# Patient Record
Sex: MALE | Race: WHITE | NOT HISPANIC OR LATINO | Employment: OTHER | URBAN - METROPOLITAN AREA
[De-identification: names, ages, dates, MRNs, and addresses within clinical notes are randomized per-mention and may not be internally consistent; named-entity substitution may affect disease eponyms.]

---

## 2016-09-29 LAB — HBA1C MFR BLD HPLC: 7.1 %

## 2017-01-30 LAB — HBA1C MFR BLD HPLC: 7.6 %

## 2017-05-23 LAB — HBA1C MFR BLD HPLC: 8.4 %

## 2017-09-14 LAB
CREAT ?TM UR-SCNC: 158.4 UMOL/L
HBA1C MFR BLD HPLC: 8.9 %
MICROALBUMIN UR-MCNC: 15.8 MG/L (ref 0–20)
MICROALBUMIN/CREAT UR: 10 MG/G{CREAT}

## 2018-01-17 LAB — HBA1C MFR BLD HPLC: 8.1 %

## 2018-08-01 DIAGNOSIS — E11.8 TYPE 2 DIABETES MELLITUS WITH COMPLICATION, UNSPECIFIED WHETHER LONG TERM INSULIN USE: Primary | ICD-10-CM

## 2018-08-01 RX ORDER — PIOGLITAZONEHYDROCHLORIDE 15 MG/1
15 TABLET ORAL DAILY
Qty: 30 TABLET | Refills: 0 | Status: SHIPPED | OUTPATIENT
Start: 2018-08-01 | End: 2018-08-14 | Stop reason: SDUPTHER

## 2018-08-01 RX ORDER — GLIMEPIRIDE 1 MG/1
1 TABLET ORAL
Qty: 30 TABLET | Refills: 0 | Status: SHIPPED | OUTPATIENT
Start: 2018-08-01 | End: 2018-08-14 | Stop reason: SDUPTHER

## 2018-08-06 LAB — HBA1C MFR BLD HPLC: 7.4 %

## 2018-08-14 ENCOUNTER — OFFICE VISIT (OUTPATIENT)
Dept: ENDOCRINOLOGY | Facility: HOSPITAL | Age: 68
End: 2018-08-14
Payer: MEDICARE

## 2018-08-14 VITALS
HEIGHT: 70 IN | BODY MASS INDEX: 33.01 KG/M2 | WEIGHT: 230.6 LBS | DIASTOLIC BLOOD PRESSURE: 70 MMHG | HEART RATE: 86 BPM | SYSTOLIC BLOOD PRESSURE: 130 MMHG

## 2018-08-14 DIAGNOSIS — E11.42 TYPE 2 DIABETES MELLITUS WITH DIABETIC POLYNEUROPATHY, WITHOUT LONG-TERM CURRENT USE OF INSULIN (HCC): Primary | ICD-10-CM

## 2018-08-14 DIAGNOSIS — E11.8 TYPE 2 DIABETES MELLITUS WITH COMPLICATION, UNSPECIFIED WHETHER LONG TERM INSULIN USE: ICD-10-CM

## 2018-08-14 DIAGNOSIS — E78.5 HYPERLIPIDEMIA, UNSPECIFIED HYPERLIPIDEMIA TYPE: ICD-10-CM

## 2018-08-14 PROCEDURE — 99204 OFFICE O/P NEW MOD 45 MIN: CPT | Performed by: INTERNAL MEDICINE

## 2018-08-14 RX ORDER — NORTRIPTYLINE HYDROCHLORIDE 25 MG/1
25 CAPSULE ORAL DAILY
COMMUNITY
End: 2021-06-17

## 2018-08-14 RX ORDER — GABAPENTIN 100 MG/1
500 CAPSULE ORAL 3 TIMES DAILY
COMMUNITY

## 2018-08-14 RX ORDER — ROSUVASTATIN CALCIUM 5 MG/1
5 TABLET, COATED ORAL DAILY
COMMUNITY

## 2018-08-14 RX ORDER — GLIMEPIRIDE 1 MG/1
2 TABLET ORAL
Qty: 30 TABLET | Refills: 11 | Status: SHIPPED | OUTPATIENT
Start: 2018-08-14 | End: 2018-08-20 | Stop reason: SDUPTHER

## 2018-08-14 RX ORDER — PIOGLITAZONEHYDROCHLORIDE 15 MG/1
30 TABLET ORAL DAILY
Qty: 30 TABLET | Refills: 11 | Status: SHIPPED | OUTPATIENT
Start: 2018-08-14 | End: 2018-08-20 | Stop reason: SDUPTHER

## 2018-08-14 RX ORDER — OMEPRAZOLE 40 MG/1
40 CAPSULE, DELAYED RELEASE ORAL DAILY
COMMUNITY
End: 2021-06-17

## 2018-08-14 RX ORDER — TRAMADOL HYDROCHLORIDE 50 MG/1
50 TABLET ORAL EVERY 6 HOURS
COMMUNITY

## 2018-08-14 NOTE — PROGRESS NOTES
8/14/2018    Assessment/Plan      Diagnoses and all orders for this visit:    Type 2 diabetes mellitus with diabetic polyneuropathy, without long-term current use of insulin (HCC)  -     metFORMIN (GLUCOPHAGE) 1000 MG tablet; Take 1 tablet (1,000 mg total) by mouth 2 (two) times a day with meals  -     HEMOGLOBIN A1C W/ EAG ESTIMATION Lab Collect; Future  -     Comprehensive metabolic panel Lab Collect; Future    Hyperlipidemia, unspecified hyperlipidemia type    Type 2 diabetes mellitus with complication, unspecified whether long term insulin use (HCC)  -     glimepiride (AMARYL) 1 mg tablet; Take 2 tablets (2 mg total) by mouth daily with breakfast  -     pioglitazone (ACTOS) 15 mg tablet; Take 2 tablets (30 mg total) by mouth daily    Other orders  -     Discontinue: metFORMIN (GLUCOPHAGE) 1000 MG tablet; Take 1,000 mg by mouth 2 (two) times a day  -     glucose blood (EVERETT CONTOUR TEST) test strip; every 24 hours  -     rosuvastatin (CRESTOR) 5 mg tablet; Take 5 mg by mouth daily  -     gabapentin (NEURONTIN) 100 mg capsule; Take 100 mg by mouth 3 (three) times a day  -     omeprazole (PriLOSEC) 40 MG capsule; Take 40 mg by mouth daily  -     traMADol (ULTRAM) 50 mg tablet; Take 50 mg by mouth every 6 (six) hours  -     nortriptyline (PAMELOR) 25 mg capsule; Take 25 mg by mouth daily        Assessment/Plan:  1  Type 2 diabetes:  His A1c was improved in recent blood work in closer to goal   However he was taking [place] and [place] and Jardiance for the majority of this A1c  I will increase his Actos to 30 mg daily and glimepiride 2 mg daily  Continue metformin 1000 mg twice a day  He will send in blood sugars in 2 weeks for review  There is room to go up on Actos and glimepiride again, however if we need to make an adjustment, it may be best to start a basal insulin right away  In this regard, he is being followed by Podiatry for a foot wound and Orthopedics for consideration for knee replacements    Therefore, it may be better to start basal insulin if he needs additional glucose control to quickly control his sugars InCase surgery is suggested  Follow-up in 3 months with labs just prior  2   Hyperlipidemia:  Stable on rosuvastatin 5 mg daily  CC: Diabetes Consult    History of Present Illness     HPI: Agapito Sharpe is a 79y o  year old male with type 2 diabetes for about 20 years  He is on oral agents at home and takes Actos 15 mg daily, Metformin 1000 bid, Glimepiride 1 mg daily  He denies any polyuria, polydipsia, nocturia and blurry vision  He denies nephropathy and retinopathy but does admit to neuropathy  He is on crestor 5 mg daily for hld  Hypoglycemic episodes: No      The patient's last eye exam was last summer/fall  Blood Sugar/Glucometer/Pump/CGM review: No logs  Review of Systems   Constitutional: Negative for chills, fatigue and fever  HENT: Negative for trouble swallowing and voice change  Eyes: Negative for visual disturbance  Respiratory: Negative for shortness of breath  Cardiovascular: Negative for chest pain, palpitations and leg swelling  Gastrointestinal: Negative for abdominal pain, nausea and vomiting  Endocrine: Negative for polydipsia and polyuria  Musculoskeletal: Negative for arthralgias and myalgias  Skin: Negative for rash  Neurological: Negative for dizziness, tremors and weakness  Hematological: Negative for adenopathy  Psychiatric/Behavioral: Negative for agitation and confusion  Historical Information   No past medical history on file  No past surgical history on file    Social History   History   Alcohol use Not on file     History   Drug use: Unknown     History   Smoking Status    Former Smoker    Packs/day: 0 75    Years: 15 00    Types: Cigarettes    Start date: 8/14/1983   Smokeless Tobacco    Never Used     Family History:   Family History   Problem Relation Age of Onset    Hypertension Mother     No Known Problems Father    Sergei Dinero Diabetes type II Sister     No Known Problems Brother     Diabetes type II Sister        Meds/Allergies   Current Outpatient Prescriptions   Medication Sig Dispense Refill    gabapentin (NEURONTIN) 100 mg capsule Take 100 mg by mouth 3 (three) times a day      glimepiride (AMARYL) 1 mg tablet Take 2 tablets (2 mg total) by mouth daily with breakfast 30 tablet 11    glucose blood (EVERETT CONTOUR TEST) test strip every 24 hours      metFORMIN (GLUCOPHAGE) 1000 MG tablet Take 1 tablet (1,000 mg total) by mouth 2 (two) times a day with meals 60 tablet 11    nortriptyline (PAMELOR) 25 mg capsule Take 25 mg by mouth daily      omeprazole (PriLOSEC) 40 MG capsule Take 40 mg by mouth daily      pioglitazone (ACTOS) 15 mg tablet Take 2 tablets (30 mg total) by mouth daily 30 tablet 11    rosuvastatin (CRESTOR) 5 mg tablet Take 5 mg by mouth daily      traMADol (ULTRAM) 50 mg tablet Take 50 mg by mouth every 6 (six) hours       No current facility-administered medications for this visit  Allergies   Allergen Reactions    Penicillin G      Other reaction(s): Unknown       Objective   Vitals: Blood pressure 130/70, pulse 86, height 5' 10" (1 778 m), weight 105 kg (230 lb 9 6 oz)  Invasive Devices          No matching active lines, drains, or airways          Physical Exam   Constitutional: He is oriented to person, place, and time  He appears well-developed and well-nourished  No distress  HENT:   Head: Normocephalic and atraumatic  Eyes: Conjunctivae are normal  Pupils are equal, round, and reactive to light  Neck: Normal range of motion  Neck supple  No thyromegaly present  Cardiovascular: Normal rate and regular rhythm  No murmur heard  Pulmonary/Chest: Effort normal and breath sounds normal  No respiratory distress  Abdominal: Soft  Bowel sounds are normal  He exhibits no distension  There is no tenderness  Musculoskeletal: Normal range of motion  He exhibits no edema     Neurological: He is alert and oriented to person, place, and time  He exhibits normal muscle tone  Skin: Skin is warm and dry  No rash noted  He is not diaphoretic  Psychiatric: He has a normal mood and affect  His behavior is normal    Vitals reviewed  The history was obtained from the review of the chart and from the patient  Lab Results:   Labs from [address] [place] [address] on 08/06/2018: White blood cell 6 8, hemoglobin 14 2, platelets 245, glucose 188, BUN 23, creatinine 1 0, GFR 78, sodium 140, potassium 4 9, calcium 10 1, albumin 4 3, liver function within normal limits, total cholesterol 122, triglycerides 111, HDL 46, LDL 54, A1c 7 4, B12 381  No future appointments

## 2018-08-14 NOTE — LETTER
August 14, 2018     [address] [place] [address] [facility]    Patient: Rajeev Wild   YOB: 1950   Date of Visit: 8/14/2018       Dear Dr Carlyle Domingo:    Thank you for referring Rajeev Wild to me for evaluation  Below are my notes for this consultation  If you have questions, please do not hesitate to call me  I look forward to following your patient along with you  Sincerely,        Princess Bucio DO        CC: No Recipients  Princess Bucio DO  8/14/2018  8:36 AM  Sign at close encounter  8/14/2018    Assessment/Plan      Diagnoses and all orders for this visit:    Type 2 diabetes mellitus with diabetic polyneuropathy, without long-term current use of insulin (HCC)  -     metFORMIN (GLUCOPHAGE) 1000 MG tablet; Take 1 tablet (1,000 mg total) by mouth 2 (two) times a day with meals  -     HEMOGLOBIN A1C W/ EAG ESTIMATION Lab Collect; Future  -     Comprehensive metabolic panel Lab Collect; Future    Hyperlipidemia, unspecified hyperlipidemia type    Type 2 diabetes mellitus with complication, unspecified whether long term insulin use (HCC)  -     glimepiride (AMARYL) 1 mg tablet; Take 2 tablets (2 mg total) by mouth daily with breakfast  -     pioglitazone (ACTOS) 15 mg tablet; Take 2 tablets (30 mg total) by mouth daily    Other orders  -     Discontinue: metFORMIN (GLUCOPHAGE) 1000 MG tablet; Take 1,000 mg by mouth 2 (two) times a day  -     glucose blood (EVERETT CONTOUR TEST) test strip; every 24 hours  -     rosuvastatin (CRESTOR) 5 mg tablet; Take 5 mg by mouth daily  -     gabapentin (NEURONTIN) 100 mg capsule; Take 100 mg by mouth 3 (three) times a day  -     omeprazole (PriLOSEC) 40 MG capsule; Take 40 mg by mouth daily  -     traMADol (ULTRAM) 50 mg tablet; Take 50 mg by mouth every 6 (six) hours  -     nortriptyline (PAMELOR) 25 mg capsule; Take 25 mg by mouth daily        Assessment/Plan:  1    Type 2 diabetes:  His A1c was improved in recent blood work in closer to goal  However he was taking [place] and Jardiance for the majority of this A1c  I will increase his Actos to 30 mg daily and glimepiride 2 mg daily  Continue metformin 1000 mg twice a day  He will send in blood sugars in 2 weeks for review  There is room to go up on Actos and glimepiride again, however if we need to make an adjustment, it may be best to start a basal insulin right away  In this regard, he is being followed by Podiatry for a foot wound and Orthopedics for consideration for knee replacements  Therefore, it may be better to start basal insulin if he needs additional glucose control to quickly control his sugars InCase surgery is suggested  Follow-up in 3 months with labs just prior  2   Hyperlipidemia:  Stable on rosuvastatin 5 mg daily  CC: Diabetes Consult    History of Present Illness     HPI: Jose E Steel is a 79y o  year old male with type 2 diabetes for about 20 years  He is on oral agents at home and takes Actos 15 mg daily, Metformin 1000 bid, Glimepiride 1 mg daily  He denies any polyuria, polydipsia, nocturia and blurry vision  He denies nephropathy and retinopathy but does admit to neuropathy  He is on crestor 5 mg daily for hld  Hypoglycemic episodes: No      The patient's last eye exam was last summer/fall  Blood Sugar/Glucometer/Pump/CGM review: No logs  Review of Systems   Constitutional: Negative for chills, fatigue and fever  HENT: Negative for trouble swallowing and voice change  Eyes: Negative for visual disturbance  Respiratory: Negative for shortness of breath  Cardiovascular: Negative for chest pain, palpitations and leg swelling  Gastrointestinal: Negative for abdominal pain, nausea and vomiting  Endocrine: Negative for polydipsia and polyuria  Musculoskeletal: Negative for arthralgias and myalgias  Skin: Negative for rash  Neurological: Negative for dizziness, tremors and weakness  Hematological: Negative for adenopathy  Psychiatric/Behavioral: Negative for agitation and confusion  Historical Information   No past medical history on file  No past surgical history on file  Social History   History   Alcohol use Not on file     History   Drug use: Unknown     History   Smoking Status    Former Smoker    Packs/day: 0 75    Years: 15 00    Types: Cigarettes    Start date: 8/14/1983   Smokeless Tobacco    Never Used     Family History:   Family History   Problem Relation Age of Onset    Hypertension Mother     No Known Problems Father     Diabetes type II Sister     No Known Problems Brother     Diabetes type II Sister        Meds/Allergies   Current Outpatient Prescriptions   Medication Sig Dispense Refill    gabapentin (NEURONTIN) 100 mg capsule Take 100 mg by mouth 3 (three) times a day      glimepiride (AMARYL) 1 mg tablet Take 2 tablets (2 mg total) by mouth daily with breakfast 30 tablet 11    glucose blood (EVERETT CONTOUR TEST) test strip every 24 hours      metFORMIN (GLUCOPHAGE) 1000 MG tablet Take 1 tablet (1,000 mg total) by mouth 2 (two) times a day with meals 60 tablet 11    nortriptyline (PAMELOR) 25 mg capsule Take 25 mg by mouth daily      omeprazole (PriLOSEC) 40 MG capsule Take 40 mg by mouth daily      pioglitazone (ACTOS) 15 mg tablet Take 2 tablets (30 mg total) by mouth daily 30 tablet 11    rosuvastatin (CRESTOR) 5 mg tablet Take 5 mg by mouth daily      traMADol (ULTRAM) 50 mg tablet Take 50 mg by mouth every 6 (six) hours       No current facility-administered medications for this visit  Allergies   Allergen Reactions    Penicillin G      Other reaction(s): Unknown       Objective   Vitals: Blood pressure 130/70, pulse 86, height 5' 10" (1 778 m), weight 105 kg (230 lb 9 6 oz)  Invasive Devices          No matching active lines, drains, or airways          Physical Exam   Constitutional: He is oriented to person, place, and time  He appears well-developed and well-nourished   No distress  HENT:   Head: Normocephalic and atraumatic  Eyes: Conjunctivae are normal  Pupils are equal, round, and reactive to light  Neck: Normal range of motion  Neck supple  No thyromegaly present  Cardiovascular: Normal rate and regular rhythm  No murmur heard  Pulmonary/Chest: Effort normal and breath sounds normal  No respiratory distress  Abdominal: Soft  Bowel sounds are normal  He exhibits no distension  There is no tenderness  Musculoskeletal: Normal range of motion  He exhibits no edema  Neurological: He is alert and oriented to person, place, and time  He exhibits normal muscle tone  Skin: Skin is warm and dry  No rash noted  He is not diaphoretic  Psychiatric: He has a normal mood and affect  His behavior is normal    Vitals reviewed  The history was obtained from the review of the chart and from the patient  Lab Results:   Labs from [address] [place] [address] on 08/06/2018: White blood cell 6 8, hemoglobin 14 2, platelets 529, glucose 188, BUN 23, creatinine 1 0, GFR 78, sodium 140, potassium 4 9, calcium 10 1, albumin 4 3, liver function within normal limits, total cholesterol 122, triglycerides 111, HDL 46, LDL 54, A1c 7 4, B12 381  No future appointments

## 2018-08-20 DIAGNOSIS — E11.8 TYPE 2 DIABETES MELLITUS WITH COMPLICATION, UNSPECIFIED WHETHER LONG TERM INSULIN USE: ICD-10-CM

## 2018-08-20 RX ORDER — PIOGLITAZONEHYDROCHLORIDE 15 MG/1
30 TABLET ORAL DAILY
Qty: 60 TABLET | Refills: 5 | Status: SHIPPED | OUTPATIENT
Start: 2018-08-20 | End: 2018-08-21 | Stop reason: SDUPTHER

## 2018-08-20 RX ORDER — GLIMEPIRIDE 1 MG/1
2 TABLET ORAL
Qty: 60 TABLET | Refills: 5 | Status: SHIPPED | OUTPATIENT
Start: 2018-08-20 | End: 2018-08-21 | Stop reason: SDUPTHER

## 2018-08-20 NOTE — TELEPHONE ENCOUNTER
You sent scripts for pt's Actos and Glimepiride  He is to be taking them twice daily but we only sent #30  Please resend

## 2018-08-21 DIAGNOSIS — E11.8 TYPE 2 DIABETES MELLITUS WITH COMPLICATION, UNSPECIFIED WHETHER LONG TERM INSULIN USE: ICD-10-CM

## 2018-08-21 RX ORDER — PIOGLITAZONEHYDROCHLORIDE 30 MG/1
TABLET ORAL
Qty: 30 TABLET | Refills: 11 | Status: SHIPPED | OUTPATIENT
Start: 2018-08-21 | End: 2018-11-29 | Stop reason: SDUPTHER

## 2018-08-21 RX ORDER — GLIMEPIRIDE 2 MG/1
TABLET ORAL
Qty: 30 TABLET | Refills: 11 | Status: SHIPPED | OUTPATIENT
Start: 2018-08-21 | End: 2019-01-22 | Stop reason: SDUPTHER

## 2018-09-27 ENCOUNTER — TELEPHONE (OUTPATIENT)
Dept: ENDOCRINOLOGY | Facility: HOSPITAL | Age: 68
End: 2018-09-27

## 2018-09-27 NOTE — TELEPHONE ENCOUNTER
Received a request regarding clearance for left total knee replacement for the patient  His A1c from 08/06/2018 was 7 4 which is okay  Because his medications have changed per my last office note, I asked that he send in blood sugar logs for review before I sign off on diabetes control before surgery  Can he send these in from the previous 2 weeks?

## 2018-10-05 NOTE — TELEPHONE ENCOUNTER
Reviewed blood sugars from 09/27 through 10/3  Fasting blood sugars range from  and 6:00 p m  blood sugars range from 147-177  Overall based on the A1c as well as these blood sugar ranges I think the patient is at reasonable risk from a diabetes perspective in glycemic control for upcoming orthopedic surgery  On the day of surgery, he should NOT take glimepiride to avoid possible low blood sugars  He should take metformin and Actos today of surgery though

## 2018-11-24 LAB
ALBUMIN SERPL-MCNC: 3.9 G/DL (ref 3.6–4.8)
ALBUMIN/GLOB SERPL: 1.6 {RATIO} (ref 1.2–2.2)
ALP SERPL-CCNC: 98 IU/L (ref 39–117)
ALT SERPL-CCNC: 12 IU/L (ref 0–44)
AST SERPL-CCNC: 14 IU/L (ref 0–40)
BILIRUB SERPL-MCNC: 0.2 MG/DL (ref 0–1.2)
BUN SERPL-MCNC: 15 MG/DL (ref 8–27)
BUN/CREAT SERPL: 15 (ref 10–24)
CALCIUM SERPL-MCNC: 9.5 MG/DL (ref 8.6–10.2)
CHLORIDE SERPL-SCNC: 98 MMOL/L (ref 96–106)
CO2 SERPL-SCNC: 27 MMOL/L (ref 20–29)
CREAT SERPL-MCNC: 1.03 MG/DL (ref 0.76–1.27)
EST. AVERAGE GLUCOSE BLD GHB EST-MCNC: 166 MG/DL
GLOBULIN SER-MCNC: 2.5 G/DL (ref 1.5–4.5)
GLUCOSE SERPL-MCNC: 171 MG/DL (ref 65–99)
HBA1C MFR BLD: 7.4 % (ref 4.8–5.6)
POTASSIUM SERPL-SCNC: 4.9 MMOL/L (ref 3.5–5.2)
PROT SERPL-MCNC: 6.4 G/DL (ref 6–8.5)
SL AMB EGFR AFRICAN AMERICAN: 87 ML/MIN/1.73
SL AMB EGFR NON AFRICAN AMERICAN: 75 ML/MIN/1.73
SODIUM SERPL-SCNC: 138 MMOL/L (ref 134–144)

## 2018-11-29 ENCOUNTER — OFFICE VISIT (OUTPATIENT)
Dept: ENDOCRINOLOGY | Facility: HOSPITAL | Age: 68
End: 2018-11-29
Payer: MEDICARE

## 2018-11-29 VITALS
SYSTOLIC BLOOD PRESSURE: 142 MMHG | DIASTOLIC BLOOD PRESSURE: 80 MMHG | WEIGHT: 233.4 LBS | HEIGHT: 70 IN | BODY MASS INDEX: 33.41 KG/M2 | HEART RATE: 90 BPM

## 2018-11-29 DIAGNOSIS — E11.42 TYPE 2 DIABETES MELLITUS WITH DIABETIC POLYNEUROPATHY, WITHOUT LONG-TERM CURRENT USE OF INSULIN (HCC): Primary | ICD-10-CM

## 2018-11-29 DIAGNOSIS — E11.8 TYPE 2 DIABETES MELLITUS WITH COMPLICATION, UNSPECIFIED WHETHER LONG TERM INSULIN USE: ICD-10-CM

## 2018-11-29 DIAGNOSIS — E78.5 HYPERLIPIDEMIA, UNSPECIFIED HYPERLIPIDEMIA TYPE: ICD-10-CM

## 2018-11-29 PROCEDURE — 99213 OFFICE O/P EST LOW 20 MIN: CPT | Performed by: NURSE PRACTITIONER

## 2018-11-29 RX ORDER — PIOGLITAZONEHYDROCHLORIDE 45 MG/1
TABLET ORAL
Qty: 30 TABLET | Refills: 4 | Status: SHIPPED | OUTPATIENT
Start: 2018-11-29 | End: 2019-05-20 | Stop reason: SDUPTHER

## 2018-11-29 NOTE — PROGRESS NOTES
Fabby Dailey 79 y o  male MRN: [id number]    Encounter: [id number]      Assessment/Plan     Assessment: This is a 79y o -year-old male with type 2 diabetes with hyperlipidemia  Plan:  1  Type 2 diabetes:  His hemoglobin A1c is unchanged at 7 4  He has not checked his blood sugars since his left knee replacement surgery approximately 1 month ago  For now, we will increase Actos to 45 mg   I have asked him to check his blood sugars twice daily at alternating times and send a record back to the office in 2 weeks for review so that further adjustments can be made to his regimen to help his glycemic control throughout the day  Stressed the importance of checking blood sugars regularly  He is continuing to participate in PT after his surgery  He is following up with Dr Aj Malagon for regular diabetic foot care  I have encouraged him to have a diabetic eye exam completed and a report sent to the office to be made part of his chart  Check hemoglobin A1c, comprehensive metabolic panel and urine microalbumin prior to next visit  2   Hyperlipidemia:   Continue Crestor  Check fasting lipid panel prior to next visit  CC:   Type 2 Diabetes follow-up    History of Present Illness     HPI:  79y o  year old male with type 2 diabetes for about 20 years  He is on oral agents at home and takes Actos 30 mg daily, Metformin 1000 twice daily, Glimepiride 2 mg daily  He is one month status post left knee replacement  He denies any episodes of hypoglycemia, polyuria, polydipsia, nocturia and blurry vision  He denies nephropathy and retinopathy but does admit to neuropathy  He follows Dr Bibi Grover for regular diabetic foot care following a diabetic ulcer to his left great toe which has since healed    He denies retinopathy or blurry vision but states that he is due for a diabetic eye exam      Blood Sugar/Glucometer/Pump/CGM review: No logs provided for review in the office today   He states he is not checking his blood sugars  For his hyperlipidemia, he is treated with Crestor 5 mg daily  Review of Systems   Constitutional: Negative  Negative for diaphoresis, fatigue and fever  HENT: Negative  Negative for trouble swallowing  Eyes: Negative  Respiratory: Negative for cough and shortness of breath  Cardiovascular: Negative for chest pain and palpitations  Gastrointestinal: Negative for abdominal pain, constipation, diarrhea, nausea and vomiting  Endocrine: Positive for polyuria (Once per night nocturia)  Negative for cold intolerance, heat intolerance, polydipsia and polyphagia  Genitourinary: Negative  Musculoskeletal: Positive for gait problem (utilizes cane) and joint swelling (left knee-post op)  Skin: Negative  Allergic/Immunologic: Negative  Neurological: Negative for dizziness, syncope, light-headedness and headaches  Hematological: Negative  Psychiatric/Behavioral: Negative  All other systems reviewed and are negative  Historical Information   No past medical history on file  No past surgical history on file  Social History   History   Alcohol use Not on file     History   Drug use: Unknown     History   Smoking Status    Former Smoker    Packs/day: 0 75    Years: 15 00    Types: Cigarettes    Start date: 8/14/1983   Smokeless Tobacco    Never Used     Family History:   Family History   Problem Relation Age of Onset    Hypertension Mother     No Known Problems Father     Diabetes type II Sister     No Known Problems Brother     Diabetes type II Sister        Meds/Allergies   Current Outpatient Prescriptions   Medication Sig Dispense Refill    gabapentin (NEURONTIN) 100 mg capsule Take 100 mg by mouth 3 (three) times a day      glimepiride (AMARYL) 2 mg tablet 1 tab daily   30 tablet 11    glucose blood (EVERETT CONTOUR TEST) test strip every 24 hours      metFORMIN (GLUCOPHAGE) 1000 MG tablet Take 1 tablet (1,000 mg total) by mouth 2 (two) times a day with meals 60 tablet 11    nortriptyline (PAMELOR) 25 mg capsule Take 25 mg by mouth daily      omeprazole (PriLOSEC) 40 MG capsule Take 40 mg by mouth daily      pioglitazone (ACTOS) 30 mg tablet 1 tab daily  30 tablet 11    rosuvastatin (CRESTOR) 5 mg tablet Take 5 mg by mouth daily      traMADol (ULTRAM) 50 mg tablet Take 50 mg by mouth every 6 (six) hours       No current facility-administered medications for this visit  Allergies   Allergen Reactions    Penicillin G      Other reaction(s): Unknown       Objective   Vitals: Blood pressure 142/80, pulse 90, height 5' 10" (1 778 m), weight 106 kg (233 lb 6 4 oz)  Physical Exam   Constitutional: He is oriented to person, place, and time  He appears well-developed and well-nourished  No distress  Overweight   HENT:   Head: Normocephalic and atraumatic  Nose: Nose normal    Mouth/Throat: Oropharynx is clear and moist    Eyes: Pupils are equal, round, and reactive to light  Conjunctivae and EOM are normal  Right eye exhibits no discharge  Left eye exhibits no discharge  No scleral icterus  Neck: Normal range of motion  Neck supple  No JVD present  No tracheal deviation present  No thyromegaly present  Cardiovascular: Normal rate, regular rhythm, normal heart sounds and intact distal pulses  Exam reveals no gallop and no friction rub  No murmur heard  Pulmonary/Chest: Effort normal and breath sounds normal  No stridor  No respiratory distress  He has no wheezes  He has no rales  He exhibits no tenderness  Abdominal: Soft  Bowel sounds are normal  He exhibits no distension  There is no tenderness  Musculoskeletal: Normal range of motion  He exhibits no edema, tenderness or deformity  Lymphadenopathy:     He has no cervical adenopathy  Neurological: He is alert and oriented to person, place, and time  He displays normal reflexes  No cranial nerve deficit  Coordination (utilizes cane -post op) abnormal    Skin: Skin is warm and dry   No rash noted  No erythema  No pallor  Dry skin   Psychiatric: He has a normal mood and affect  His behavior is normal  Judgment and thought content normal    Vitals reviewed  Lab Results:   Lab Results   Component Value Date/Time    Hemoglobin A1C 7 4 (H) 11/23/2018 10:13 AM    Hemoglobin A1C 7 4 08/06/2018    Hemoglobin A1C 8 1 01/17/2018    BUN 15 11/23/2018 10:13 AM    Potassium 4 9 11/23/2018 10:13 AM    Chloride 98 11/23/2018 10:13 AM    CO2 27 11/23/2018 10:13 AM    Albumin 3 9 11/23/2018 10:13 AM    Globulin, Total 2 5 11/23/2018 10:13 AM     Portions of the record may have been created with voice recognition software  Occasional wrong word or "sound a like" substitutions may have occurred due to the inherent limitations of voice recognition software  Read the chart carefully and recognize, using context, where substitutions have occurred

## 2018-11-29 NOTE — PATIENT INSTRUCTIONS
Be mindful of diet  Stay active and stay hydrated  Check your blood sugars regularly  Increase Actos to 45 mg daily  Continue metformin and glimepiride at current dose  Send a record of your blood sugars to the office in 2 weeks for review  Continue to follow up with Dr Dickson Aggarwal for Podiatry  Follow up with Ophthalmology for a diabetic eye exam     Continue Crestor

## 2019-01-22 DIAGNOSIS — E11.8 TYPE 2 DIABETES MELLITUS WITH COMPLICATION, UNSPECIFIED WHETHER LONG TERM INSULIN USE: ICD-10-CM

## 2019-01-23 RX ORDER — GLIMEPIRIDE 2 MG/1
TABLET ORAL
Qty: 90 TABLET | Refills: 1 | Status: SHIPPED | OUTPATIENT
Start: 2019-01-23 | End: 2019-03-07

## 2019-02-27 LAB
ALBUMIN SERPL-MCNC: 4.2 G/DL (ref 3.6–4.8)
ALBUMIN/CREAT UR: 7.4 MG/G CREAT (ref 0–30)
ALBUMIN/GLOB SERPL: 1.7 {RATIO} (ref 1.2–2.2)
ALP SERPL-CCNC: 99 IU/L (ref 39–117)
ALT SERPL-CCNC: 16 IU/L (ref 0–44)
AST SERPL-CCNC: 18 IU/L (ref 0–40)
BILIRUB SERPL-MCNC: <0.2 MG/DL (ref 0–1.2)
BUN SERPL-MCNC: 12 MG/DL (ref 8–27)
BUN/CREAT SERPL: 13 (ref 10–24)
CALCIUM SERPL-MCNC: 9.5 MG/DL (ref 8.6–10.2)
CHLORIDE SERPL-SCNC: 103 MMOL/L (ref 96–106)
CHOLEST SERPL-MCNC: 107 MG/DL (ref 100–199)
CO2 SERPL-SCNC: 26 MMOL/L (ref 20–29)
CREAT SERPL-MCNC: 0.9 MG/DL (ref 0.76–1.27)
CREAT UR-MCNC: 128.7 MG/DL
EST. AVERAGE GLUCOSE BLD GHB EST-MCNC: 171 MG/DL
GLOBULIN SER-MCNC: 2.5 G/DL (ref 1.5–4.5)
GLUCOSE SERPL-MCNC: 137 MG/DL (ref 65–99)
HBA1C MFR BLD: 7.6 % (ref 4.8–5.6)
HDLC SERPL-MCNC: 45 MG/DL
LDLC SERPL CALC-MCNC: 41 MG/DL (ref 0–99)
MICROALBUMIN UR-MCNC: 9.5 UG/ML
POTASSIUM SERPL-SCNC: 4.9 MMOL/L (ref 3.5–5.2)
PROT SERPL-MCNC: 6.7 G/DL (ref 6–8.5)
SL AMB EGFR AFRICAN AMERICAN: 102 ML/MIN/1.73
SL AMB EGFR NON AFRICAN AMERICAN: 88 ML/MIN/1.73
SL AMB VLDL CHOLESTEROL CALC: 21 MG/DL (ref 5–40)
SODIUM SERPL-SCNC: 142 MMOL/L (ref 134–144)
TRIGL SERPL-MCNC: 105 MG/DL (ref 0–149)

## 2019-03-07 ENCOUNTER — OFFICE VISIT (OUTPATIENT)
Dept: ENDOCRINOLOGY | Facility: HOSPITAL | Age: 69
End: 2019-03-07
Payer: MEDICARE

## 2019-03-07 VITALS
BODY MASS INDEX: 33.49 KG/M2 | HEIGHT: 70 IN | SYSTOLIC BLOOD PRESSURE: 120 MMHG | HEART RATE: 80 BPM | DIASTOLIC BLOOD PRESSURE: 62 MMHG

## 2019-03-07 DIAGNOSIS — E11.42 TYPE 2 DIABETES MELLITUS WITH DIABETIC POLYNEUROPATHY, WITHOUT LONG-TERM CURRENT USE OF INSULIN (HCC): Primary | ICD-10-CM

## 2019-03-07 DIAGNOSIS — E78.5 HYPERLIPIDEMIA, UNSPECIFIED HYPERLIPIDEMIA TYPE: ICD-10-CM

## 2019-03-07 PROCEDURE — 99214 OFFICE O/P EST MOD 30 MIN: CPT | Performed by: INTERNAL MEDICINE

## 2019-03-07 RX ORDER — GLIMEPIRIDE 4 MG/1
TABLET ORAL
Qty: 30 TABLET | Refills: 11 | Status: SHIPPED | OUTPATIENT
Start: 2019-03-07 | End: 2020-03-18

## 2019-03-07 RX ORDER — ASPIRIN 81 MG/1
81 TABLET ORAL DAILY
COMMUNITY

## 2019-03-07 NOTE — PROGRESS NOTES
3/7/2019    Assessment/Plan      Diagnoses and all orders for this visit:    Type 2 diabetes mellitus with diabetic polyneuropathy, without long-term current use of insulin (HCC)  -     glimepiride (AMARYL) 4 mg tablet; 1 tab daily   -     HEMOGLOBIN A1C W/ EAG ESTIMATION Lab Collect; Future  -     Comprehensive metabolic panel Lab Collect; Future  -     Microalbumin / creatinine urine ratio- Lab Collect; Future  -     TSH, 3rd generation Lab Collect; Future  -     CBC and differential- Lab Collect; Future  -     Ambulatory referral to medical nutrition therapy for diabetes; Future    Hyperlipidemia, unspecified hyperlipidemia type    Other orders  -     aspirin (ECOTRIN LOW STRENGTH) 81 mg EC tablet; Take 81 mg by mouth daily        Assessment/Plan:  1  Type 2 diabetes:  He continues to follow-up with his podiatrist as well as Orthopedics for Charcot foot  In this respect, he has not been as active  His A1c remains above goal likely related to decreased activity  Will increase glimepiride 4 mg daily  Continue Actos 45 mg daily metformin 1000 mg twice a day  I asked him to call me if he develops any hypoglycemia  Will have him meet with [facility] as well  2  Hyperlipidemia:  Continue rosuvastatin 5 mg daily  CC: Diabetes Consult    History of Present Illness     HPI: Rolan Nieto is a 81y o  year old male with type 2 diabetes for Over 20 years  He is on oral agents at home and takes metformin 1000 mg twice a day, glimepiride 2 mg daily, Actos 45 mg daily  [place] Sheikh He denies any polyuria, polydipsia, nocturia and blurry vision  He denies nephropathy and retinopathy but does admit to neuropathy  Hypoglycemic episodes: No      He is due for an eye exam will call schedule this  He follows with Dr Bhavik Jarquin regularly for diabetes foot care per    Blood Sugar/Glucometer/Pump/CGM review:  Checks his blood sugar every now and then and typically is around 150 at various times of the day      For hyperlipidemia he is maintained on rosuvastatin 5 mg daily  Review of Systems   Constitutional: Negative for fatigue  HENT: Negative for trouble swallowing and voice change  Eyes: Negative for visual disturbance  Respiratory: Negative for shortness of breath  Cardiovascular: Negative for palpitations and leg swelling  Gastrointestinal: Negative for abdominal pain, nausea and vomiting  Endocrine: Negative for polydipsia and polyuria  Musculoskeletal: Negative for arthralgias and myalgias  Skin: Negative for rash  Neurological: Negative for dizziness, tremors and weakness  Hematological: Negative for adenopathy  Psychiatric/Behavioral: Negative for agitation and confusion  Historical Information   History reviewed  No pertinent past medical history  History reviewed  No pertinent surgical history    Social History   Social History     Substance and Sexual Activity   Alcohol Use Not on file     Social History     Substance and Sexual Activity   Drug Use Not on file     Social History     Tobacco Use   Smoking Status Former Smoker    Packs/day: 0 75    Years: 15 00    Pack years: 11 25    Types: Cigarettes    Start date: 8/14/1983   Smokeless Tobacco Never Used     Family History:   Family History   Problem Relation Age of Onset    Hypertension Mother     No Known Problems Father     Diabetes type II Sister     No Known Problems Brother     Diabetes type II Sister        Meds/Allergies   Current Outpatient Medications   Medication Sig Dispense Refill    aspirin (ECOTRIN LOW STRENGTH) 81 mg EC tablet Take 81 mg by mouth daily      gabapentin (NEURONTIN) 100 mg capsule Take 500 mg by mouth 3 (three) times a day       glucose blood (EVERETT CONTOUR TEST) test strip every 24 hours      metFORMIN (GLUCOPHAGE) 1000 MG tablet Take 1 tablet (1,000 mg total) by mouth 2 (two) times a day with meals 60 tablet 11    nortriptyline (PAMELOR) 25 mg capsule Take 25 mg by mouth daily      omeprazole (PriLOSEC) 40 MG capsule Take 40 mg by mouth daily      pioglitazone (ACTOS) 45 mg tablet 1 tab daily  30 tablet 4    rosuvastatin (CRESTOR) 5 mg tablet Take 5 mg by mouth daily      traMADol (ULTRAM) 50 mg tablet Take 50 mg by mouth every 6 (six) hours      glimepiride (AMARYL) 4 mg tablet 1 tab daily  30 tablet 11     No current facility-administered medications for this visit  Allergies   Allergen Reactions    Penicillin G      Other reaction(s): Unknown       Objective   Vitals: Blood pressure 120/62, pulse 80, height 5' 10" (1 778 m)  Invasive Devices          None          Physical Exam   Constitutional: He is oriented to person, place, and time  He appears well-developed and well-nourished  No distress  HENT:   Head: Normocephalic and atraumatic  Eyes: Pupils are equal, round, and reactive to light  Conjunctivae are normal    Neck: Normal range of motion  Neck supple  No thyromegaly present  Cardiovascular: Normal rate and regular rhythm  Pulmonary/Chest: Effort normal and breath sounds normal    Abdominal: Soft  Bowel sounds are normal    Musculoskeletal: Normal range of motion  He exhibits no edema  Neurological: He is alert and oriented to person, place, and time  He exhibits normal muscle tone  Skin: Skin is warm and dry  No rash noted  He is not diaphoretic  Psychiatric: He has a normal mood and affect  His behavior is normal    Vitals reviewed  The history was obtained from the review of the chart and from the patient      Lab Results:    Most recent Alc is  Lab Results   Component Value Date    HGBA1C 7 6 (H) 02/26/2019           No components found for: HA1C  No components found for: GLU    Lab Results   Component Value Date    BUN 12 02/26/2019    K 4 9 02/26/2019     02/26/2019    CO2 26 02/26/2019     No results found for: EGFR  No components found for: [facility]    Lab Results   Component Value Date    HDL 45 02/26/2019    TRIG 105 02/26/2019       Lab Results   Component Value Date    ALT 16 02/26/2019    AST 18 02/26/2019       No results found for: TSH, FREET4, TSI    Recent Results (from the past 24140 hour(s))   Hemoglobin A1C    Collection Time: 01/17/18 12:00 AM   Result Value Ref Range    Hemoglobin A1C 8 1    Hemoglobin A1C    Collection Time: 08/06/18 12:00 AM   Result Value Ref Range    Hemoglobin A1C 7 4    Comprehensive metabolic panel    Collection Time: 11/23/18 10:13 AM   Result Value Ref Range    Glucose, Random 171 (H) 65 - 99 mg/dL    BUN 15 8 - 27 mg/dL    Creatinine 1 03 0 76 - 1 27 mg/dL    eGFR Non African American 75 >59 mL/min/1 73    eGFR  87 >59 mL/min/1 73    SL AMB BUN/CREATININE RATIO 15 10 - 24    Sodium 138 134 - 144 mmol/L    Potassium 4 9 3 5 - 5 2 mmol/L    Chloride 98 96 - 106 mmol/L    CO2 27 20 - 29 mmol/L    CALCIUM 9 5 8 6 - 10 2 mg/dL    Protein, Total 6 4 6 0 - 8 5 g/dL    Albumin 3 9 3 6 - 4 8 g/dL    Globulin, Total 2 5 1 5 - 4 5 g/dL    Albumin/Globulin Ratio 1 6 1 2 - 2 2    TOTAL BILIRUBIN 0 2 0 0 - 1 2 mg/dL    Alk Phos Isoenzymes 98 39 - 117 IU/L    AST 14 0 - 40 IU/L    ALT 12 0 - 44 IU/L   Hemoglobin A1C    Collection Time: 11/23/18 10:13 AM   Result Value Ref Range    Hemoglobin A1C 7 4 (H) 4 8 - 5 6 %    Estimated Average Glucose 166 mg/dL   Comprehensive metabolic panel    Collection Time: 02/26/19 10:30 AM   Result Value Ref Range    Glucose, Random 137 (H) 65 - 99 mg/dL    BUN 12 8 - 27 mg/dL    Creatinine 0 90 0 76 - 1 27 mg/dL    eGFR Non  88 >59 mL/min/1 73    eGFR  102 >59 mL/min/1 73    SL AMB BUN/CREATININE RATIO 13 10 - 24    Sodium 142 134 - 144 mmol/L    Potassium 4 9 3 5 - 5 2 mmol/L    Chloride 103 96 - 106 mmol/L    CO2 26 20 - 29 mmol/L    CALCIUM 9 5 8 6 - 10 2 mg/dL    Protein, Total 6 7 6 0 - 8 5 g/dL    Albumin 4 2 3 6 - 4 8 g/dL    Globulin, Total 2 5 1 5 - 4 5 g/dL    Albumin/Globulin Ratio 1 7 1 2 - 2 2    TOTAL BILIRUBIN <0 2 0 0 - 1 2 mg/dL    Alk Phos Isoenzymes 99 39 - 117 IU/L    AST 18 0 - 40 IU/L    ALT 16 0 - 44 IU/L   Lipid panel    Collection Time: 02/26/19 10:30 AM   Result Value Ref Range    Cholesterol, Total 107 100 - 199 mg/dL    Triglycerides 105 0 - 149 mg/dL    HDL 45 >39 mg/dL    VLDL Cholesterol Calculated 21 5 - 40 mg/dL    LDL Direct 41 0 - 99 mg/dL   Microalbumin / creatinine urine ratio    Collection Time: 02/26/19 10:30 AM   Result Value Ref Range    Creatinine, Urine 128 7 Not Estab  mg/dL    Microalbum  ,U,Random 9 5 Not Estab  ug/mL    Microalb/Creat Ratio 7 4 0 0 - 30 0 mg/g creat   Hemoglobin A1C    Collection Time: 02/26/19 10:30 AM   Result Value Ref Range    Hemoglobin A1C 7 6 (H) 4 8 - 5 6 %    Estimated Average Glucose 171 mg/dL         No future appointments  Portions of the record may have been created with voice recognition software  Occasional wrong word or "sound a like" substitutions may have occurred due to the inherent limitations of voice recognition software  Read the chart carefully and recognize, using context, where substitutions have occurred

## 2019-05-20 DIAGNOSIS — E11.8 TYPE 2 DIABETES MELLITUS WITH COMPLICATION, UNSPECIFIED WHETHER LONG TERM INSULIN USE: ICD-10-CM

## 2019-05-20 RX ORDER — PIOGLITAZONEHYDROCHLORIDE 45 MG/1
TABLET ORAL
Qty: 30 TABLET | Refills: 4 | Status: SHIPPED | OUTPATIENT
Start: 2019-05-20 | End: 2019-05-24 | Stop reason: SDUPTHER

## 2019-05-24 DIAGNOSIS — E11.8 TYPE 2 DIABETES MELLITUS WITH COMPLICATION, UNSPECIFIED WHETHER LONG TERM INSULIN USE: ICD-10-CM

## 2019-05-28 RX ORDER — PIOGLITAZONEHYDROCHLORIDE 45 MG/1
TABLET ORAL
Qty: 30 TABLET | Refills: 4 | Status: SHIPPED | OUTPATIENT
Start: 2019-05-28 | End: 2019-11-13 | Stop reason: SDUPTHER

## 2019-06-07 LAB
ALBUMIN SERPL-MCNC: 4 G/DL (ref 3.6–4.8)
ALBUMIN/CREAT UR: <4.5 MG/G CREAT (ref 0–30)
ALBUMIN/GLOB SERPL: 1.4 {RATIO} (ref 1.2–2.2)
ALP SERPL-CCNC: 101 IU/L (ref 39–117)
ALT SERPL-CCNC: 15 IU/L (ref 0–44)
AST SERPL-CCNC: 17 IU/L (ref 0–40)
BASOPHILS # BLD AUTO: 0 X10E3/UL (ref 0–0.2)
BASOPHILS NFR BLD AUTO: 0 %
BILIRUB SERPL-MCNC: <0.2 MG/DL (ref 0–1.2)
BUN SERPL-MCNC: 17 MG/DL (ref 8–27)
BUN/CREAT SERPL: 16 (ref 10–24)
CALCIUM SERPL-MCNC: 9.5 MG/DL (ref 8.6–10.2)
CHLORIDE SERPL-SCNC: 100 MMOL/L (ref 96–106)
CO2 SERPL-SCNC: 25 MMOL/L (ref 20–29)
CREAT SERPL-MCNC: 1.07 MG/DL (ref 0.76–1.27)
CREAT UR-MCNC: 67.4 MG/DL
EOSINOPHIL # BLD AUTO: 0.2 X10E3/UL (ref 0–0.4)
EOSINOPHIL NFR BLD AUTO: 3 %
ERYTHROCYTE [DISTWIDTH] IN BLOOD BY AUTOMATED COUNT: 16.1 % (ref 12.3–15.4)
EST. AVERAGE GLUCOSE BLD GHB EST-MCNC: 180 MG/DL
GLOBULIN SER-MCNC: 2.8 G/DL (ref 1.5–4.5)
GLUCOSE SERPL-MCNC: 146 MG/DL (ref 65–99)
HBA1C MFR BLD: 7.9 % (ref 4.8–5.6)
HCT VFR BLD AUTO: 33.1 % (ref 37.5–51)
HGB BLD-MCNC: 10.6 G/DL (ref 13–17.7)
IMM GRANULOCYTES # BLD: 0 X10E3/UL (ref 0–0.1)
IMM GRANULOCYTES NFR BLD: 0 %
LABCORP COMMENT: NORMAL
LYMPHOCYTES # BLD AUTO: 2 X10E3/UL (ref 0.7–3.1)
LYMPHOCYTES NFR BLD AUTO: 35 %
MCH RBC QN AUTO: 25.2 PG (ref 26.6–33)
MCHC RBC AUTO-ENTMCNC: 32 G/DL (ref 31.5–35.7)
MCV RBC AUTO: 79 FL (ref 79–97)
MICROALBUMIN UR-MCNC: <3 UG/ML
MONOCYTES # BLD AUTO: 0.4 X10E3/UL (ref 0.1–0.9)
MONOCYTES NFR BLD AUTO: 8 %
NEUTROPHILS # BLD AUTO: 3 X10E3/UL (ref 1.4–7)
NEUTROPHILS NFR BLD AUTO: 54 %
PLATELET # BLD AUTO: 303 X10E3/UL (ref 150–450)
POTASSIUM SERPL-SCNC: 4.8 MMOL/L (ref 3.5–5.2)
PROT SERPL-MCNC: 6.8 G/DL (ref 6–8.5)
RBC # BLD AUTO: 4.2 X10E6/UL (ref 4.14–5.8)
SL AMB EGFR AFRICAN AMERICAN: 83 ML/MIN/1.73
SL AMB EGFR NON AFRICAN AMERICAN: 71 ML/MIN/1.73
SODIUM SERPL-SCNC: 139 MMOL/L (ref 134–144)
TSH SERPL DL<=0.005 MIU/L-ACNC: 3.58 UIU/ML (ref 0.45–4.5)
WBC # BLD AUTO: 5.7 X10E3/UL (ref 3.4–10.8)

## 2019-06-12 ENCOUNTER — OFFICE VISIT (OUTPATIENT)
Dept: ENDOCRINOLOGY | Facility: HOSPITAL | Age: 69
End: 2019-06-12
Payer: MEDICARE

## 2019-06-12 VITALS
HEIGHT: 70 IN | HEART RATE: 92 BPM | BODY MASS INDEX: 33.49 KG/M2 | DIASTOLIC BLOOD PRESSURE: 72 MMHG | SYSTOLIC BLOOD PRESSURE: 140 MMHG

## 2019-06-12 DIAGNOSIS — E11.42 TYPE 2 DIABETES MELLITUS WITH DIABETIC POLYNEUROPATHY, WITHOUT LONG-TERM CURRENT USE OF INSULIN (HCC): Primary | ICD-10-CM

## 2019-06-12 DIAGNOSIS — E78.5 HYPERLIPIDEMIA, UNSPECIFIED HYPERLIPIDEMIA TYPE: ICD-10-CM

## 2019-06-12 PROCEDURE — 99214 OFFICE O/P EST MOD 30 MIN: CPT | Performed by: INTERNAL MEDICINE

## 2019-07-23 ENCOUNTER — TELEPHONE (OUTPATIENT)
Dept: ENDOCRINOLOGY | Facility: CLINIC | Age: 69
End: 2019-07-23

## 2019-07-23 ENCOUNTER — TELEPHONE (OUTPATIENT)
Dept: OTHER | Facility: HOSPITAL | Age: 69
End: 2019-07-23

## 2019-07-23 NOTE — TELEPHONE ENCOUNTER
Reviewed blood sugars from 7/1-7/14  Blood sugars overall look like they are improving  Every now and then are blood sugars above 200 and rarely above 300 which could be do to changes in diet  The best thing to do is to make sure maintain a carb consistent diet  Continue current meds for now

## 2019-09-10 LAB
CREAT ?TM UR-SCNC: 186 UMOL/L
EXT MICROALBUMIN URINE RANDOM: 8.5
HBA1C MFR BLD HPLC: 7.5 %
MICROALBUMIN/CREAT UR: 4.6 MG/G{CREAT}

## 2019-09-11 LAB
ALBUMIN SERPL-MCNC: 4.3 G/DL (ref 3.6–4.8)
ALBUMIN/GLOB SERPL: 1.6 {RATIO} (ref 1.2–2.2)
ALP SERPL-CCNC: 93 IU/L (ref 39–117)
ALT SERPL-CCNC: 20 IU/L (ref 0–44)
AST SERPL-CCNC: 21 IU/L (ref 0–40)
BILIRUB SERPL-MCNC: <0.2 MG/DL (ref 0–1.2)
BUN SERPL-MCNC: 23 MG/DL (ref 8–27)
BUN/CREAT SERPL: 22 (ref 10–24)
CALCIUM SERPL-MCNC: 9.8 MG/DL (ref 8.6–10.2)
CHLORIDE SERPL-SCNC: 102 MMOL/L (ref 96–106)
CO2 SERPL-SCNC: 25 MMOL/L (ref 20–29)
CREAT SERPL-MCNC: 1.06 MG/DL (ref 0.76–1.27)
EST. AVERAGE GLUCOSE BLD GHB EST-MCNC: 166 MG/DL
GLOBULIN SER-MCNC: 2.7 G/DL (ref 1.5–4.5)
GLUCOSE SERPL-MCNC: 113 MG/DL (ref 65–99)
HBA1C MFR BLD: 7.4 % (ref 4.8–5.6)
POTASSIUM SERPL-SCNC: 4.9 MMOL/L (ref 3.5–5.2)
PROT SERPL-MCNC: 7 G/DL (ref 6–8.5)
SL AMB EGFR AFRICAN AMERICAN: 84 ML/MIN/1.73
SL AMB EGFR NON AFRICAN AMERICAN: 72 ML/MIN/1.73
SODIUM SERPL-SCNC: 143 MMOL/L (ref 134–144)

## 2019-09-21 DIAGNOSIS — E11.42 TYPE 2 DIABETES MELLITUS WITH DIABETIC POLYNEUROPATHY, WITHOUT LONG-TERM CURRENT USE OF INSULIN (HCC): ICD-10-CM

## 2019-09-23 ENCOUNTER — OFFICE VISIT (OUTPATIENT)
Dept: ENDOCRINOLOGY | Facility: HOSPITAL | Age: 69
End: 2019-09-23
Payer: MEDICARE

## 2019-09-23 VITALS
HEIGHT: 70 IN | HEART RATE: 76 BPM | DIASTOLIC BLOOD PRESSURE: 74 MMHG | WEIGHT: 259.4 LBS | SYSTOLIC BLOOD PRESSURE: 134 MMHG | BODY MASS INDEX: 37.14 KG/M2

## 2019-09-23 DIAGNOSIS — E11.42 TYPE 2 DIABETES MELLITUS WITH DIABETIC POLYNEUROPATHY, WITHOUT LONG-TERM CURRENT USE OF INSULIN (HCC): Primary | ICD-10-CM

## 2019-09-23 DIAGNOSIS — E78.5 HYPERLIPIDEMIA, UNSPECIFIED HYPERLIPIDEMIA TYPE: ICD-10-CM

## 2019-09-23 PROCEDURE — 99214 OFFICE O/P EST MOD 30 MIN: CPT | Performed by: INTERNAL MEDICINE

## 2019-09-23 NOTE — PROGRESS NOTES
9/23/2019    Assessment/Plan      Diagnoses and all orders for this visit:    Type 2 diabetes mellitus with diabetic polyneuropathy, without long-term current use of insulin (HCC)  -     Hemoglobin A1C; Future  -     Comprehensive metabolic panel; Future  -     CBC and differential; Future  -     Microalbumin / creatinine urine ratio; Future  -     TSH, 3rd generation; Future    Hyperlipidemia, unspecified hyperlipidemia type  -     Lipid Panel with Direct LDL reflex; Future        Assessment/Plan:  1  Type 2 diabetes:  A1c is improving with most recent value of 7 4  Overall blood sugars look reasonable on current regimen  I will continue current regimen and see him back in about 3 or 4 months with labs as ordered above just prior  Encouraged him to update an eye exam     2  Hyperlipidemia:  Check lipid panel before next appointment  CC: Diabetes Consult    History of Present Illness     HPI: Rajeev Wild is a 81y o  year old male with type 2 diabetes for over 20 years  He is on oral agents at home and takes Actos 45 mg daily, metformin 1000 mg twice a day, glimepiride 4 mg daily  He denies any polyuria, polydipsia, nocturia and blurry vision  He denies nephropathy and retinopathy but does admit to neuropathy  He continues on gabapentin for treatment of peripheral neuropathy  Hypoglycemic episodes: Yes a very rarely and only occurred 1 time over the past few months  He is due for an eye exam and I encouraged him to do so  Follows with Dr Justine Nelson regularly for diabetes foot care  Blood Sugar/Glucometer/Pump/CGM review:  Blood sugars checked twice a day at different times of the day are typically controlled  There is 1 hypoglycemic value  There are occasional blood sugars over 200 which is typically diet related  For hyperlipidemia, continues on rosuvastatin 5 mg daily  Review of Systems   Constitutional: Negative for fatigue  HENT: Negative for trouble swallowing and voice change  Eyes: Negative for visual disturbance  Respiratory: Negative for shortness of breath  Cardiovascular: Negative for palpitations and leg swelling  Gastrointestinal: Negative for abdominal pain, nausea and vomiting  Endocrine: Negative for polydipsia and polyuria  Musculoskeletal: Negative for arthralgias and myalgias  Skin: Negative for rash  Neurological: Negative for dizziness, tremors and weakness  Hematological: Negative for adenopathy  Psychiatric/Behavioral: Negative for agitation and confusion  Historical Information   History reviewed  No pertinent past medical history  History reviewed  No pertinent surgical history  Social History   Social History     Substance and Sexual Activity   Alcohol Use Not on file     Social History     Substance and Sexual Activity   Drug Use Not on file     Social History     Tobacco Use   Smoking Status Former Smoker    Packs/day: 0 75    Years: 15 00    Pack years: 11 25    Types: Cigarettes    Start date: 8/14/1983   Smokeless Tobacco Never Used     Family History:   Family History   Problem Relation Age of Onset    Hypertension Mother     No Known Problems Father     Diabetes type II Sister     No Known Problems Brother     Diabetes type II Sister        Meds/Allergies   Current Outpatient Medications   Medication Sig Dispense Refill    aspirin (ECOTRIN LOW STRENGTH) 81 mg EC tablet Take 81 mg by mouth daily      gabapentin (NEURONTIN) 100 mg capsule Take 500 mg by mouth 3 (three) times a day       glimepiride (AMARYL) 4 mg tablet 1 tab daily   30 tablet 11    glucose blood (EVERETT CONTOUR TEST) test strip every 24 hours      metFORMIN (GLUCOPHAGE) 1000 MG tablet TAKE 1 TABLET BY MOUTH TWICE DAILY WITH MEALS 60 tablet 11    omeprazole (PriLOSEC) 40 MG capsule Take 40 mg by mouth daily      pioglitazone (ACTOS) 45 mg tablet TAKE 1 TABLET BY MOUTH ONCE DAILY 30 tablet 4    rosuvastatin (CRESTOR) 5 mg tablet Take 5 mg by mouth daily  traMADol (ULTRAM) 50 mg tablet Take 50 mg by mouth every 6 (six) hours      nortriptyline (PAMELOR) 25 mg capsule Take 25 mg by mouth daily       No current facility-administered medications for this visit  Allergies   Allergen Reactions    Penicillin G      Other reaction(s): Unknown       Objective   Vitals: Blood pressure 134/74, pulse 76, height 5' 10" (1 778 m), weight 118 kg (259 lb 6 4 oz)  Invasive Devices     None                 Physical Exam   Constitutional: He is oriented to person, place, and time  He appears well-developed and well-nourished  No distress  HENT:   Head: Normocephalic and atraumatic  Neck: Normal range of motion  Neck supple  No thyromegaly present  Cardiovascular: Normal rate and regular rhythm  Pulmonary/Chest: Effort normal and breath sounds normal  No respiratory distress  Abdominal: Soft  Bowel sounds are normal    Musculoskeletal: Normal range of motion  He exhibits no edema  Neurological: He is alert and oriented to person, place, and time  He exhibits normal muscle tone  Skin: Skin is warm and dry  No rash noted  He is not diaphoretic  Psychiatric: He has a normal mood and affect  His behavior is normal    Vitals reviewed  The history was obtained from the review of the chart and from the patient      Lab Results:    Most recent Alc is  Lab Results   Component Value Date    HGBA1C 7 4 (H) 09/10/2019           No components found for: HA1C  No components found for: GLU    Lab Results   Component Value Date    CREATININE 1 06 09/10/2019    CREATININE 1 07 06/06/2019    CREATININE 0 90 02/26/2019    BUN 23 09/10/2019    K 4 9 09/10/2019     09/10/2019    CO2 25 09/10/2019     No results found for: EGFR  No components found for: [facility]    Lab Results   Component Value Date    HDL 45 02/26/2019    TRIG 105 02/26/2019       Lab Results   Component Value Date    ALT 20 09/10/2019    AST 21 09/10/2019       Lab Results   Component Value Date TSH 3 580 06/06/2019       Recent Results (from the past 41220 hour(s))   Hemoglobin A1C    Collection Time: 08/06/18 12:00 AM   Result Value Ref Range    Hemoglobin A1C 7 4    Comprehensive metabolic panel    Collection Time: 11/23/18 10:13 AM   Result Value Ref Range    Glucose, Random 171 (H) 65 - 99 mg/dL    BUN 15 8 - 27 mg/dL    Creatinine 1 03 0 76 - 1 27 mg/dL    eGFR Non African American 75 >59 mL/min/1 73    eGFR  87 >59 mL/min/1 73    SL AMB BUN/CREATININE RATIO 15 10 - 24    Sodium 138 134 - 144 mmol/L    Potassium 4 9 3 5 - 5 2 mmol/L    Chloride 98 96 - 106 mmol/L    CO2 27 20 - 29 mmol/L    CALCIUM 9 5 8 6 - 10 2 mg/dL    Protein, Total 6 4 6 0 - 8 5 g/dL    Albumin 3 9 3 6 - 4 8 g/dL    Globulin, Total 2 5 1 5 - 4 5 g/dL    Albumin/Globulin Ratio 1 6 1 2 - 2 2    TOTAL BILIRUBIN 0 2 0 0 - 1 2 mg/dL    Alk Phos Isoenzymes 98 39 - 117 IU/L    AST 14 0 - 40 IU/L    ALT 12 0 - 44 IU/L   Hemoglobin A1C    Collection Time: 11/23/18 10:13 AM   Result Value Ref Range    Hemoglobin A1C 7 4 (H) 4 8 - 5 6 %    Estimated Average Glucose 166 mg/dL   Comprehensive metabolic panel    Collection Time: 02/26/19 10:30 AM   Result Value Ref Range    Glucose, Random 137 (H) 65 - 99 mg/dL    BUN 12 8 - 27 mg/dL    Creatinine 0 90 0 76 - 1 27 mg/dL    eGFR Non  88 >59 mL/min/1 73    eGFR  102 >59 mL/min/1 73    SL AMB BUN/CREATININE RATIO 13 10 - 24    Sodium 142 134 - 144 mmol/L    Potassium 4 9 3 5 - 5 2 mmol/L    Chloride 103 96 - 106 mmol/L    CO2 26 20 - 29 mmol/L    CALCIUM 9 5 8 6 - 10 2 mg/dL    Protein, Total 6 7 6 0 - 8 5 g/dL    Albumin 4 2 3 6 - 4 8 g/dL    Globulin, Total 2 5 1 5 - 4 5 g/dL    Albumin/Globulin Ratio 1 7 1 2 - 2 2    TOTAL BILIRUBIN <0 2 0 0 - 1 2 mg/dL    Alk Phos Isoenzymes 99 39 - 117 IU/L    AST 18 0 - 40 IU/L    ALT 16 0 - 44 IU/L   Lipid panel    Collection Time: 02/26/19 10:30 AM   Result Value Ref Range    Cholesterol, Total 107 100 - 199 mg/dL    Triglycerides 105 0 - 149 mg/dL    HDL 45 >39 mg/dL    VLDL Cholesterol Calculated 21 5 - 40 mg/dL    LDL Direct 41 0 - 99 mg/dL   Microalbumin / creatinine urine ratio    Collection Time: 02/26/19 10:30 AM   Result Value Ref Range    Creatinine, Urine 128 7 Not Estab  mg/dL    Microalbum  ,U,Random 9 5 Not Estab  ug/mL    Microalb/Creat Ratio 7 4 0 0 - 30 0 mg/g creat   Hemoglobin A1C    Collection Time: 02/26/19 10:30 AM   Result Value Ref Range    Hemoglobin A1C 7 6 (H) 4 8 - 5 6 %    Estimated Average Glucose 171 mg/dL   Ambig Abbrev Default    Collection Time: 06/06/19 10:52 AM   Result Value Ref Range    White Blood Cell Count 5 7 3 4 - 10 8 x10E3/uL    Red Blood Cell Count 4 20 4  14 - 5 80 x10E6/uL    Hemoglobin 10 6 (L) 13 0 - 17 7 g/dL    HCT 33 1 (L) 37 5 - 51 0 %    MCV 79 79 - 97 fL    MCH 25 2 (L) 26 6 - 33 0 pg    MCHC 32 0 31 5 - 35 7 g/dL    RDW 16 1 (H) 12 3 - 15 4 %    Platelet Count 380 643 - 450 x10E3/uL    Neutrophils 54 Not Estab  %    Lymphocytes 35 Not Estab  %    Monocytes 8 Not Estab  %    Eosinophils 3 Not Estab  %    Basophils PCT 0 Not Estab  %    Neutrophils (Absolute) 3 0 1 4 - 7 0 x10E3/uL    Lymphocytes (Absolute) 2 0 0 7 - 3 1 x10E3/uL    Monocytes (Absolute) 0 4 0 1 - 0 9 x10E3/uL    Eosinophils (Absolute) 0 2 0 0 - 0 4 x10E3/uL    Basophils ABS 0 0 0 0 - 0 2 x10E3/uL    Immature Granulocytes 0 Not Estab  %    Immature Granulocytes (Absolute) 0 0 0 0 - 0 1 x10E3/uL   Comprehensive metabolic panel    Collection Time: 06/06/19 10:52 AM   Result Value Ref Range    Glucose, Random 146 (H) 65 - 99 mg/dL    BUN 17 8 - 27 mg/dL    Creatinine 1 07 0 76 - 1 27 mg/dL    eGFR Non  71 >59 mL/min/1 73    eGFR  83 >59 mL/min/1 73    SL AMB BUN/CREATININE RATIO 16 10 - 24    Sodium 139 134 - 144 mmol/L    Potassium 4 8 3 5 - 5 2 mmol/L    Chloride 100 96 - 106 mmol/L    CO2 25 20 - 29 mmol/L    CALCIUM 9 5 8 6 - 10 2 mg/dL    Protein, Total 6 8 6 0 - 8 5 g/dL    Albumin 4 0 3 6 - 4 8 g/dL    Globulin, Total 2 8 1 5 - 4 5 g/dL    Albumin/Globulin Ratio 1 4 1 2 - 2 2    TOTAL BILIRUBIN <0 2 0 0 - 1 2 mg/dL    Alk Phos Isoenzymes 101 39 - 117 IU/L    AST 17 0 - 40 IU/L    ALT 15 0 - 44 IU/L   Microalbumin / creatinine urine ratio    Collection Time: 06/06/19 10:52 AM   Result Value Ref Range    Creatinine, Urine 67 4 Not Estab  mg/dL    Microalbum  ,U,Random <3 0 Not Estab  ug/mL    Microalb/Creat Ratio <4 5 0 0 - 30 0 mg/g creat   Hemoglobin A1C    Collection Time: 06/06/19 10:52 AM   Result Value Ref Range    Hemoglobin A1C 7 9 (H) 4 8 - 5 6 %    Estimated Average Glucose 180 mg/dL   TSH, 3rd generation    Collection Time: 06/06/19 10:52 AM   Result Value Ref Range    TSH 3 580 0 450 - 4 500 uIU/mL   Specimen Status Report    Collection Time: 06/06/19 10:52 AM   Result Value Ref Range    Comment Comment    Comprehensive metabolic panel    Collection Time: 09/10/19 11:09 AM   Result Value Ref Range    Glucose, Random 113 (H) 65 - 99 mg/dL    BUN 23 8 - 27 mg/dL    Creatinine 1 06 0 76 - 1 27 mg/dL    eGFR Non  72 >59 mL/min/1 73    eGFR  84 >59 mL/min/1 73    SL AMB BUN/CREATININE RATIO 22 10 - 24    Sodium 143 134 - 144 mmol/L    Potassium 4 9 3 5 - 5 2 mmol/L    Chloride 102 96 - 106 mmol/L    CO2 25 20 - 29 mmol/L    CALCIUM 9 8 8 6 - 10 2 mg/dL    Protein, Total 7 0 6 0 - 8 5 g/dL    Albumin 4 3 3 6 - 4 8 g/dL    Globulin, Total 2 7 1 5 - 4 5 g/dL    Albumin/Globulin Ratio 1 6 1 2 - 2 2    TOTAL BILIRUBIN <0 2 0 0 - 1 2 mg/dL    Alk Phos Isoenzymes 93 39 - 117 IU/L    AST 21 0 - 40 IU/L    ALT 20 0 - 44 IU/L   Hemoglobin A1C    Collection Time: 09/10/19 11:09 AM   Result Value Ref Range    Hemoglobin A1C 7 4 (H) 4 8 - 5 6 %    Estimated Average Glucose 166 mg/dL         No future appointments  Portions of the record may have been created with voice recognition software   Occasional wrong word or "sound a like" substitutions may have occurred due to the inherent limitations of voice recognition software  Read the chart carefully and recognize, using context, where substitutions have occurred

## 2019-11-13 DIAGNOSIS — E11.8 TYPE 2 DIABETES MELLITUS WITH COMPLICATION (HCC): Primary | ICD-10-CM

## 2019-11-13 RX ORDER — PIOGLITAZONEHYDROCHLORIDE 45 MG/1
TABLET ORAL
Qty: 30 TABLET | Refills: 4 | Status: SHIPPED | OUTPATIENT
Start: 2019-11-13 | End: 2020-05-05

## 2019-11-13 NOTE — TELEPHONE ENCOUNTER
Pt called for a refill of his Actos  Pt saw you in September and has a follow up in January with Ion Campbell

## 2020-01-15 LAB
ALBUMIN SERPL-MCNC: 4.2 G/DL (ref 3.6–4.8)
ALBUMIN/CREAT UR: 8.1 MG/G CREAT (ref 0–30)
ALBUMIN/GLOB SERPL: 1.9 {RATIO} (ref 1.2–2.2)
ALP SERPL-CCNC: 88 IU/L (ref 39–117)
ALT SERPL-CCNC: 15 IU/L (ref 0–44)
AST SERPL-CCNC: 19 IU/L (ref 0–40)
BASOPHILS # BLD AUTO: 0 X10E3/UL (ref 0–0.2)
BASOPHILS NFR BLD AUTO: 0 %
BILIRUB SERPL-MCNC: 0.2 MG/DL (ref 0–1.2)
BUN SERPL-MCNC: 17 MG/DL (ref 8–27)
BUN/CREAT SERPL: 18 (ref 10–24)
CALCIUM SERPL-MCNC: 8.9 MG/DL (ref 8.6–10.2)
CHLORIDE SERPL-SCNC: 104 MMOL/L (ref 96–106)
CHOLEST SERPL-MCNC: 114 MG/DL (ref 100–199)
CO2 SERPL-SCNC: 24 MMOL/L (ref 20–29)
CREAT SERPL-MCNC: 0.97 MG/DL (ref 0.76–1.27)
CREAT UR-MCNC: 193 MG/DL
EOSINOPHIL # BLD AUTO: 0.1 X10E3/UL (ref 0–0.4)
EOSINOPHIL NFR BLD AUTO: 2 %
ERYTHROCYTE [DISTWIDTH] IN BLOOD BY AUTOMATED COUNT: 15.8 % (ref 11.6–15.4)
GLOBULIN SER-MCNC: 2.2 G/DL (ref 1.5–4.5)
GLUCOSE SERPL-MCNC: 149 MG/DL (ref 65–99)
HBA1C MFR BLD: 7.4 % (ref 4.8–5.6)
HCT VFR BLD AUTO: 36.1 % (ref 37.5–51)
HDLC SERPL-MCNC: 43 MG/DL
HGB BLD-MCNC: 11.4 G/DL (ref 13–17.7)
IMM GRANULOCYTES # BLD: 0 X10E3/UL (ref 0–0.1)
IMM GRANULOCYTES NFR BLD: 0 %
LDLC SERPL CALC-MCNC: 54 MG/DL (ref 0–99)
LYMPHOCYTES # BLD AUTO: 2.1 X10E3/UL (ref 0.7–3.1)
LYMPHOCYTES NFR BLD AUTO: 39 %
MCH RBC QN AUTO: 27.2 PG (ref 26.6–33)
MCHC RBC AUTO-ENTMCNC: 31.6 G/DL (ref 31.5–35.7)
MCV RBC AUTO: 86 FL (ref 79–97)
MICROALBUMIN UR-MCNC: 15.7 UG/ML
MONOCYTES # BLD AUTO: 0.5 X10E3/UL (ref 0.1–0.9)
MONOCYTES NFR BLD AUTO: 9 %
NEUTROPHILS # BLD AUTO: 2.7 X10E3/UL (ref 1.4–7)
NEUTROPHILS NFR BLD AUTO: 50 %
PLATELET # BLD AUTO: 200 X10E3/UL (ref 150–450)
POTASSIUM SERPL-SCNC: 4.5 MMOL/L (ref 3.5–5.2)
PROT SERPL-MCNC: 6.4 G/DL (ref 6–8.5)
RBC # BLD AUTO: 4.19 X10E6/UL (ref 4.14–5.8)
SL AMB EGFR AFRICAN AMERICAN: 93 ML/MIN/1.73
SL AMB EGFR NON AFRICAN AMERICAN: 80 ML/MIN/1.73
SL AMB VLDL CHOLESTEROL CALC: 17 MG/DL (ref 5–40)
SODIUM SERPL-SCNC: 142 MMOL/L (ref 134–144)
TRIGL SERPL-MCNC: 87 MG/DL (ref 0–149)
TSH SERPL DL<=0.005 MIU/L-ACNC: 2.29 UIU/ML (ref 0.45–4.5)
WBC # BLD AUTO: 5.4 X10E3/UL (ref 3.4–10.8)

## 2020-01-28 ENCOUNTER — OFFICE VISIT (OUTPATIENT)
Dept: ENDOCRINOLOGY | Facility: HOSPITAL | Age: 70
End: 2020-01-28
Payer: MEDICARE

## 2020-01-28 VITALS
SYSTOLIC BLOOD PRESSURE: 142 MMHG | BODY MASS INDEX: 36.59 KG/M2 | WEIGHT: 255.6 LBS | DIASTOLIC BLOOD PRESSURE: 80 MMHG | HEIGHT: 70 IN | HEART RATE: 82 BPM

## 2020-01-28 DIAGNOSIS — E78.5 HYPERLIPIDEMIA, UNSPECIFIED HYPERLIPIDEMIA TYPE: ICD-10-CM

## 2020-01-28 DIAGNOSIS — E11.42 TYPE 2 DIABETES MELLITUS WITH DIABETIC POLYNEUROPATHY, WITHOUT LONG-TERM CURRENT USE OF INSULIN (HCC): Primary | ICD-10-CM

## 2020-01-28 PROCEDURE — 99214 OFFICE O/P EST MOD 30 MIN: CPT | Performed by: NURSE PRACTITIONER

## 2020-01-28 NOTE — PROGRESS NOTES
Lucas Catherine 81 y o  male MRN: [id number]    Encounter: [id number]      Assessment/Plan     Assessment: This is a 81y o -year-old male with type 2 diabetes with polyneuropathy and hyperlipidemia  Plan:  1   Type 2 diabetes:  His hemoglobin A1c is unchanged at 7 4  Reviewed and reinforced the importance of checking blood sugars regularly and following a proper diabetic diet  Offered medical nutrition therapy for help with his diet which was declined at this time  I have asked him to check his blood sugars twice daily at alternating times and send a record back to the office in 2 weeks for review so that further adjustments can be made to his regimen to help his glycemic control throughout the day  Stressed the importance of checking blood sugars regularly  He is following up with Dr Marcella Chaudhari for regular diabetic foot care and Orthopedics for care of his left foot fallen arch  I have encouraged him to have a diabetic eye exam completed and a report sent to the office to be made part of his chart  Check hemoglobin A1c, comprehensive metabolic panel prior to next visit      2   Hyperlipidemia:  Stable  Continue Crestor  CC:  Type 2 Diabetes follow-up    History of Present Illness     HPI:  81y o  year old male with type 2 diabetes for about 20 years   He is on oral agents at home and takes Actos 45 mg daily, Metformin 1000 twice daily, Glimepiride 4 mg daily  His most recent hemoglobin A1c from January 14, 2020 is 7 4  He denies any episodes of hypoglycemia, polyuria, polydipsia, nocturia and blurry vision   He denies nephropathy and retinopathy but does admit to neuropathy  He has a history of a diabetic foot ulcer and follows Dr Emanuel Hilario for regular diabetic foot care  He states his last visit was in December 2019    He denies retinopathy or blurry vision but states that he is due for a diabetic eye exam      Blood Sugar/Glucometer/Pump/CGM review: No logs provided for review in the office today   He states he is not checking his blood sugars      For his hyperlipidemia, he is treated with Crestor 5 mg daily  Review of Systems   Constitutional: Negative  Negative for chills, fatigue and fever  HENT: Negative  Negative for trouble swallowing and voice change  Eyes: Negative for photophobia, pain, discharge, redness, itching and visual disturbance  Respiratory: Negative for cough and shortness of breath  Cardiovascular: Negative for chest pain and palpitations  Gastrointestinal: Negative for abdominal pain, constipation, diarrhea, nausea and vomiting  Endocrine: Positive for polyuria (1-2 times per night nocturia)  Negative for cold intolerance, heat intolerance, polydipsia and polyphagia  Genitourinary: Negative  Musculoskeletal: Positive for arthralgias (left foot fallen arch), gait problem ( utilizes walker) and myalgias (right leg)  Skin: Negative  Allergic/Immunologic: Negative  Neurological: Negative for dizziness, syncope, light-headedness and headaches  Hematological: Negative  Psychiatric/Behavioral: Negative  All other systems reviewed and are negative  Historical Information   No past medical history on file  No past surgical history on file    Social History   Social History     Substance and Sexual Activity   Alcohol Use Not on file     Social History     Substance and Sexual Activity   Drug Use Not on file     Social History     Tobacco Use   Smoking Status Former Smoker    Packs/day: 0 75    Years: 15 00    Pack years: 11 25    Types: Cigarettes    Start date: 8/14/1983   Smokeless Tobacco Never Used     Family History:   Family History   Problem Relation Age of Onset    Hypertension Mother     No Known Problems Father     Diabetes type II Sister     No Known Problems Brother     Diabetes type II Sister        Meds/Allergies   Current Outpatient Medications   Medication Sig Dispense Refill    aspirin (ECOTRIN LOW STRENGTH) 81 mg EC tablet Take 81 mg by mouth daily      gabapentin (NEURONTIN) 100 mg capsule Take 500 mg by mouth 3 (three) times a day       glimepiride (AMARYL) 4 mg tablet 1 tab daily  30 tablet 11    glucose blood (EVERETT CONTOUR TEST) test strip every 24 hours      metFORMIN (GLUCOPHAGE) 1000 MG tablet TAKE 1 TABLET BY MOUTH TWICE DAILY WITH MEALS 60 tablet 11    omeprazole (PriLOSEC) 40 MG capsule Take 40 mg by mouth daily      pioglitazone (ACTOS) 45 mg tablet TAKE 1 TABLET BY MOUTH ONCE DAILY 30 tablet 4    rosuvastatin (CRESTOR) 5 mg tablet Take 5 mg by mouth daily      traMADol (ULTRAM) 50 mg tablet Take 50 mg by mouth every 6 (six) hours      nortriptyline (PAMELOR) 25 mg capsule Take 25 mg by mouth daily       No current facility-administered medications for this visit  Allergies   Allergen Reactions    Penicillin G      Other reaction(s): Unknown       Objective   Vitals: Blood pressure 142/80, pulse 82, height 5' 10" (1 778 m), weight 116 kg (255 lb 9 6 oz)  Physical Exam   Constitutional: He is oriented to person, place, and time  He appears well-developed and well-nourished  Overweight   HENT:   Head: Normocephalic and atraumatic  Nose: Nose normal    Mouth/Throat: Oropharynx is clear and moist    Eyes: Pupils are equal, round, and reactive to light  Conjunctivae and EOM are normal    Neck: Normal range of motion  Neck supple  Cardiovascular: Normal rate, regular rhythm, normal heart sounds and intact distal pulses  Pulmonary/Chest: Effort normal and breath sounds normal    Abdominal: Soft  Bowel sounds are normal    Musculoskeletal: Normal range of motion  Burrell Speck arch/2+ edema to left foot   Neurological: He is alert and oriented to person, place, and time  Coordination (utilized walker) abnormal    Skin: Skin is warm and dry  Dry skin   Psychiatric: He has a normal mood and affect  His behavior is normal  Judgment and thought content normal    Vitals reviewed      Lab Results:   Lab Results   Component Value Date/Time    Hemoglobin A1C 7 4 (H) 01/14/2020 12:51 PM    Hemoglobin A1C 7 4 (H) 09/10/2019 11:09 AM    Hemoglobin A1C 7 5 09/10/2019    Hemoglobin A1C 7 9 (H) 06/06/2019 10:52 AM    White Blood Cell Count 5 4 01/14/2020 12:51 PM    White Blood Cell Count 5 7 06/06/2019 10:52 AM    Hemoglobin 11 4 (L) 01/14/2020 12:51 PM    Hemoglobin 10 6 (L) 06/06/2019 10:52 AM    HCT 36 1 (L) 01/14/2020 12:51 PM    HCT 33 1 (L) 06/06/2019 10:52 AM    MCV 86 01/14/2020 12:51 PM    MCV 79 06/06/2019 10:52 AM    Platelet Count 023 96/61/6838 12:51 PM    Platelet Count 177 73/89/7152 10:52 AM    BUN 17 01/14/2020 12:51 PM    BUN 23 09/10/2019 11:09 AM    BUN 17 06/06/2019 10:52 AM    Potassium 4 5 01/14/2020 12:51 PM    Potassium 4 9 09/10/2019 11:09 AM    Potassium 4 8 06/06/2019 10:52 AM    Chloride 104 01/14/2020 12:51 PM    Chloride 102 09/10/2019 11:09 AM    Chloride 100 06/06/2019 10:52 AM    CO2 24 01/14/2020 12:51 PM    CO2 25 09/10/2019 11:09 AM    CO2 25 06/06/2019 10:52 AM    Creatinine 0 97 01/14/2020 12:51 PM    Creatinine 1 06 09/10/2019 11:09 AM    Creatinine 1 07 06/06/2019 10:52 AM    AST 19 01/14/2020 12:51 PM    AST 21 09/10/2019 11:09 AM    AST 17 06/06/2019 10:52 AM    ALT 15 01/14/2020 12:51 PM    ALT 20 09/10/2019 11:09 AM    ALT 15 06/06/2019 10:52 AM    Albumin 4 2 01/14/2020 12:51 PM    Albumin 4 3 09/10/2019 11:09 AM    Albumin 4 0 06/06/2019 10:52 AM    Globulin, Total 2 2 01/14/2020 12:51 PM    Globulin, Total 2 7 09/10/2019 11:09 AM    Globulin, Total 2 8 06/06/2019 10:52 AM    HDL 43 01/14/2020 12:51 PM    HDL 45 02/26/2019 10:30 AM    Triglycerides 87 01/14/2020 12:51 PM    Triglycerides 105 02/26/2019 10:30 AM     Portions of the record may have been created with voice recognition software  Occasional wrong word or "sound a like" substitutions may have occurred due to the inherent limitations of voice recognition software   Read the chart carefully and recognize, using context, where substitutions have occurred

## 2020-01-28 NOTE — PATIENT INSTRUCTIONS
Be mindful of diet      Stay active and stay hydrated      Check your blood sugars regularly      Continue Actos, Glimepiride and Metformin at current dose  Checking your blood sugars is important! Please check her blood sugars at least twice daily at alternating times and send a record to the office in 2 weeks for review      Continue to follow up with Dr Arik Barragan for Podiatry      Follow up with Ophthalmology for a diabetic eye exam      Continue Crestor  Consider follow up with medical nutrition therapy for help with your diet

## 2020-01-29 ENCOUNTER — VBI (OUTPATIENT)
Dept: ENDOCRINOLOGY | Facility: HOSPITAL | Age: 70
End: 2020-01-29

## 2020-03-18 DIAGNOSIS — E11.42 TYPE 2 DIABETES MELLITUS WITH DIABETIC POLYNEUROPATHY, WITHOUT LONG-TERM CURRENT USE OF INSULIN (HCC): ICD-10-CM

## 2020-03-18 RX ORDER — GLIMEPIRIDE 4 MG/1
TABLET ORAL
Qty: 30 TABLET | Refills: 0 | Status: SHIPPED | OUTPATIENT
Start: 2020-03-18 | End: 2020-04-21 | Stop reason: SDUPTHER

## 2020-04-21 DIAGNOSIS — E11.42 TYPE 2 DIABETES MELLITUS WITH DIABETIC POLYNEUROPATHY, WITHOUT LONG-TERM CURRENT USE OF INSULIN (HCC): Primary | ICD-10-CM

## 2020-04-21 RX ORDER — GLIMEPIRIDE 4 MG/1
TABLET ORAL
Qty: 90 TABLET | Refills: 1 | Status: SHIPPED | OUTPATIENT
Start: 2020-04-21 | End: 2020-10-29 | Stop reason: SDUPTHER

## 2020-05-05 DIAGNOSIS — E11.8 TYPE 2 DIABETES MELLITUS WITH COMPLICATION (HCC): ICD-10-CM

## 2020-05-05 RX ORDER — PIOGLITAZONEHYDROCHLORIDE 45 MG/1
TABLET ORAL
Qty: 30 TABLET | Refills: 4 | Status: SHIPPED | OUTPATIENT
Start: 2020-05-05 | End: 2020-10-24

## 2020-05-21 LAB
ALBUMIN SERPL-MCNC: 3.9 G/DL (ref 3.8–4.8)
ALBUMIN/GLOB SERPL: 1.5 {RATIO} (ref 1.2–2.2)
ALP SERPL-CCNC: 96 IU/L (ref 39–117)
ALT SERPL-CCNC: 13 IU/L (ref 0–44)
AST SERPL-CCNC: 14 IU/L (ref 0–40)
BILIRUB SERPL-MCNC: <0.2 MG/DL (ref 0–1.2)
BUN SERPL-MCNC: 17 MG/DL (ref 8–27)
BUN/CREAT SERPL: 19 (ref 10–24)
CALCIUM SERPL-MCNC: 9.3 MG/DL (ref 8.6–10.2)
CHLORIDE SERPL-SCNC: 99 MMOL/L (ref 96–106)
CO2 SERPL-SCNC: 27 MMOL/L (ref 20–29)
CREAT SERPL-MCNC: 0.89 MG/DL (ref 0.76–1.27)
EST. AVERAGE GLUCOSE BLD GHB EST-MCNC: 177 MG/DL
GLOBULIN SER-MCNC: 2.6 G/DL (ref 1.5–4.5)
GLUCOSE SERPL-MCNC: 162 MG/DL (ref 65–99)
HBA1C MFR BLD: 7.8 % (ref 4.8–5.6)
POTASSIUM SERPL-SCNC: 4.9 MMOL/L (ref 3.5–5.2)
PROT SERPL-MCNC: 6.5 G/DL (ref 6–8.5)
SL AMB EGFR AFRICAN AMERICAN: 102 ML/MIN/1.73
SL AMB EGFR NON AFRICAN AMERICAN: 88 ML/MIN/1.73
SODIUM SERPL-SCNC: 140 MMOL/L (ref 134–144)

## 2020-05-27 ENCOUNTER — TELEPHONE (OUTPATIENT)
Dept: ENDOCRINOLOGY | Facility: HOSPITAL | Age: 70
End: 2020-05-27

## 2020-05-28 ENCOUNTER — OFFICE VISIT (OUTPATIENT)
Dept: ENDOCRINOLOGY | Facility: HOSPITAL | Age: 70
End: 2020-05-28
Payer: MEDICARE

## 2020-05-28 VITALS
DIASTOLIC BLOOD PRESSURE: 80 MMHG | HEIGHT: 70 IN | HEART RATE: 89 BPM | SYSTOLIC BLOOD PRESSURE: 144 MMHG | TEMPERATURE: 98.1 F | BODY MASS INDEX: 35.73 KG/M2 | WEIGHT: 249.6 LBS

## 2020-05-28 DIAGNOSIS — E78.5 HYPERLIPIDEMIA, UNSPECIFIED HYPERLIPIDEMIA TYPE: ICD-10-CM

## 2020-05-28 DIAGNOSIS — E11.42 TYPE 2 DIABETES MELLITUS WITH DIABETIC POLYNEUROPATHY, WITHOUT LONG-TERM CURRENT USE OF INSULIN (HCC): Primary | ICD-10-CM

## 2020-05-28 PROCEDURE — 99214 OFFICE O/P EST MOD 30 MIN: CPT | Performed by: NURSE PRACTITIONER

## 2020-05-28 RX ORDER — GLIMEPIRIDE 2 MG/1
2 TABLET ORAL
Qty: 90 TABLET | Refills: 3 | Status: SHIPPED | OUTPATIENT
Start: 2020-05-28 | End: 2020-10-29

## 2020-08-31 DIAGNOSIS — E11.42 TYPE 2 DIABETES MELLITUS WITH DIABETIC POLYNEUROPATHY, WITHOUT LONG-TERM CURRENT USE OF INSULIN (HCC): ICD-10-CM

## 2020-10-22 ENCOUNTER — TELEPHONE (OUTPATIENT)
Dept: ENDOCRINOLOGY | Facility: HOSPITAL | Age: 70
End: 2020-10-22

## 2020-10-24 DIAGNOSIS — E11.8 TYPE 2 DIABETES MELLITUS WITH COMPLICATION (HCC): ICD-10-CM

## 2020-10-24 RX ORDER — PIOGLITAZONEHYDROCHLORIDE 45 MG/1
TABLET ORAL
Qty: 30 TABLET | Refills: 4 | Status: SHIPPED | OUTPATIENT
Start: 2020-10-24 | End: 2020-10-27

## 2020-10-27 DIAGNOSIS — E11.8 TYPE 2 DIABETES MELLITUS WITH COMPLICATION (HCC): ICD-10-CM

## 2020-10-27 RX ORDER — PIOGLITAZONEHYDROCHLORIDE 45 MG/1
TABLET ORAL
Qty: 30 TABLET | Refills: 2 | Status: SHIPPED | OUTPATIENT
Start: 2020-10-27 | End: 2020-12-01 | Stop reason: SDUPTHER

## 2020-10-29 DIAGNOSIS — E11.42 TYPE 2 DIABETES MELLITUS WITH DIABETIC POLYNEUROPATHY, WITHOUT LONG-TERM CURRENT USE OF INSULIN (HCC): ICD-10-CM

## 2020-10-29 RX ORDER — GLIMEPIRIDE 4 MG/1
TABLET ORAL
Qty: 30 TABLET | Refills: 0 | Status: SHIPPED | OUTPATIENT
Start: 2020-10-29 | End: 2020-12-01 | Stop reason: SDUPTHER

## 2020-10-31 LAB
ALBUMIN SERPL-MCNC: 3 G/DL (ref 3.8–4.8)
ALBUMIN/GLOB SERPL: 0.9 {RATIO} (ref 1.2–2.2)
ALP SERPL-CCNC: 110 IU/L (ref 39–117)
ALT SERPL-CCNC: 11 IU/L (ref 0–44)
AST SERPL-CCNC: 14 IU/L (ref 0–40)
BILIRUB SERPL-MCNC: <0.2 MG/DL (ref 0–1.2)
BUN SERPL-MCNC: 12 MG/DL (ref 8–27)
BUN/CREAT SERPL: 16 (ref 10–24)
CALCIUM SERPL-MCNC: 8.8 MG/DL (ref 8.6–10.2)
CHLORIDE SERPL-SCNC: 96 MMOL/L (ref 96–106)
CHOLEST SERPL-MCNC: 77 MG/DL (ref 100–199)
CO2 SERPL-SCNC: 29 MMOL/L (ref 20–29)
CREAT SERPL-MCNC: 0.76 MG/DL (ref 0.76–1.27)
EST. AVERAGE GLUCOSE BLD GHB EST-MCNC: 183 MG/DL
GLOBULIN SER-MCNC: 3.2 G/DL (ref 1.5–4.5)
GLUCOSE SERPL-MCNC: 111 MG/DL (ref 65–99)
HBA1C MFR BLD: 8 % (ref 4.8–5.6)
HDLC SERPL-MCNC: 34 MG/DL
LDLC SERPL CALC-MCNC: 29 MG/DL (ref 0–99)
POTASSIUM SERPL-SCNC: 4.7 MMOL/L (ref 3.5–5.2)
PROT SERPL-MCNC: 6.2 G/DL (ref 6–8.5)
SL AMB EGFR AFRICAN AMERICAN: 108 ML/MIN/1.73
SL AMB EGFR NON AFRICAN AMERICAN: 94 ML/MIN/1.73
SL AMB VLDL CHOLESTEROL CALC: 14 MG/DL (ref 5–40)
SODIUM SERPL-SCNC: 138 MMOL/L (ref 134–144)
TRIGL SERPL-MCNC: 55 MG/DL (ref 0–149)
TSH SERPL DL<=0.005 MIU/L-ACNC: 2.77 UIU/ML (ref 0.45–4.5)

## 2020-11-07 ENCOUNTER — TELEPHONE (OUTPATIENT)
Dept: GASTROENTEROLOGY | Facility: CLINIC | Age: 70
End: 2020-11-07

## 2020-11-09 ENCOUNTER — TELEPHONE (OUTPATIENT)
Dept: GASTROENTEROLOGY | Facility: CLINIC | Age: 70
End: 2020-11-09

## 2020-12-01 ENCOUNTER — OFFICE VISIT (OUTPATIENT)
Dept: ENDOCRINOLOGY | Facility: HOSPITAL | Age: 70
End: 2020-12-01
Payer: MEDICARE

## 2020-12-01 VITALS
DIASTOLIC BLOOD PRESSURE: 68 MMHG | SYSTOLIC BLOOD PRESSURE: 124 MMHG | BODY MASS INDEX: 35.81 KG/M2 | HEIGHT: 70 IN | HEART RATE: 68 BPM

## 2020-12-01 DIAGNOSIS — E78.5 HYPERLIPIDEMIA, UNSPECIFIED HYPERLIPIDEMIA TYPE: ICD-10-CM

## 2020-12-01 DIAGNOSIS — E11.8 TYPE 2 DIABETES MELLITUS WITH COMPLICATION (HCC): ICD-10-CM

## 2020-12-01 DIAGNOSIS — E11.42 TYPE 2 DIABETES MELLITUS WITH DIABETIC POLYNEUROPATHY, WITHOUT LONG-TERM CURRENT USE OF INSULIN (HCC): Primary | ICD-10-CM

## 2020-12-01 PROCEDURE — 99214 OFFICE O/P EST MOD 30 MIN: CPT | Performed by: PHYSICIAN ASSISTANT

## 2020-12-01 RX ORDER — GLIMEPIRIDE 4 MG/1
4 TABLET ORAL 2 TIMES DAILY
Qty: 180 TABLET | Refills: 1 | Status: SHIPPED | OUTPATIENT
Start: 2020-12-01 | End: 2020-12-01 | Stop reason: SDUPTHER

## 2020-12-01 RX ORDER — GLIMEPIRIDE 4 MG/1
4 TABLET ORAL 2 TIMES DAILY
Qty: 180 TABLET | Refills: 1 | Status: SHIPPED | OUTPATIENT
Start: 2020-12-01 | End: 2021-06-29 | Stop reason: SDUPTHER

## 2020-12-01 RX ORDER — PIOGLITAZONEHYDROCHLORIDE 45 MG/1
45 TABLET ORAL DAILY
Qty: 30 TABLET | Refills: 2 | Status: SHIPPED | OUTPATIENT
Start: 2020-12-01 | End: 2021-02-02

## 2020-12-15 LAB
LEFT EYE DIABETIC RETINOPATHY: NORMAL
RIGHT EYE DIABETIC RETINOPATHY: NORMAL

## 2020-12-28 ENCOUNTER — OFFICE VISIT (OUTPATIENT)
Dept: GASTROENTEROLOGY | Facility: CLINIC | Age: 70
End: 2020-12-28
Payer: MEDICARE

## 2020-12-28 VITALS — HEIGHT: 70 IN | BODY MASS INDEX: 34.36 KG/M2 | WEIGHT: 240 LBS

## 2020-12-28 DIAGNOSIS — K21.9 GASTROESOPHAGEAL REFLUX DISEASE, UNSPECIFIED WHETHER ESOPHAGITIS PRESENT: Primary | ICD-10-CM

## 2020-12-28 DIAGNOSIS — D50.9 IRON DEFICIENCY ANEMIA, UNSPECIFIED IRON DEFICIENCY ANEMIA TYPE: ICD-10-CM

## 2020-12-28 DIAGNOSIS — Z86.010 PERSONAL HISTORY OF COLONIC POLYPS: ICD-10-CM

## 2020-12-28 PROBLEM — Z86.0100 PERSONAL HISTORY OF COLONIC POLYPS: Status: ACTIVE | Noted: 2020-12-28

## 2020-12-28 PROCEDURE — 99442 PR PHYS/QHP TELEPHONE EVALUATION 11-20 MIN: CPT | Performed by: INTERNAL MEDICINE

## 2020-12-28 NOTE — H&P (VIEW-ONLY)
Virtual Brief Visit  It was my intent to perform this visit via video technology but the patient was not able to do a video connection so the visit was completed via audio telephone only  Assessment/Plan:  1  Iron deficiency anemia  Diagnosed in November when hospitalized with infected left ankle hardware  No recent EGD  Last colonoscopy 2016  - EGD and colonoscopy at [facility]    2  GERD  Chronic issue  Been on omeprazole for many years  No recent EGD    3  Personal history of colon polyps  Last colonoscopy January 2016  Problem List Items Addressed This Visit        Digestive    GERD (gastroesophageal reflux disease) - Primary       Other    Iron deficiency anemia    Personal history of colonic polyps                Reason for visit is   Chief Complaint   Patient presents with    iron Deficiency Anemia     [facility] follow up    Virtual Brief Visit        Encounter provider Elier Costa MD    Provider located at [address] [address] [phone]    Recent Visits  No visits were found meeting these conditions  Showing recent visits within past 7 days and meeting all other requirements     Today's Visits  Date Type Provider Dept   12/28/20 Office Visit Pierce Hanna MD [facility]   Showing today's visits and meeting all other requirements     Future Appointments  No visits were found meeting these conditions  Showing future appointments within next 150 days and meeting all other requirements        After connecting through telephone, the patient was identified by name and date of birth  Jimbo Haywood was informed that this is a telemedicine visit and that the visit is being conducted through telephone  My office door was closed  No one else was in the room  He acknowledged consent and understanding of privacy and security of the platform   The patient has agreed to participate and understands he can discontinue the visit at any time  Patient is aware this is a billable service  Subjective    Stacie Steward is a 78 y o  male who had a phone conversation in follow-up from recent hospitalization  He is having a phone conversation secondary to a recent [place] exposure and is currently quarantining  He was recently hospitalized at [facility] with an infected left ankle hardware  He completed a course of antibiotics last week  He was evaluated in the hospital secondary to an iron deficiency anemia  He denies any GI bleeding at that point time  He has a history of chronic GERD and takes omeprazole daily  He reports his last EGD was many years ago  He also has a history of colon polyps  His last colonoscopy was January of 2016  His weight is stable  He denies any hematochezia, hematemesis, coffee-ground emesis, or melena  Erick LANZA     Past Medical History:   Diagnosis Date    Charcot ankle, left     Diabetes mellitus ([address] )     Diabetic neuropathy (HCC)     GERD (gastroesophageal reflux disease) 12/28/2020    Iron deficiency anemia 12/28/2020    Personal history of colonic polyps 12/28/2020    Last colonoscopy 2016       Past Surgical History:   Procedure Laterality Date    ANKLE SURGERY Left     REPLACEMENT TOTAL KNEE Left        Current Outpatient Medications   Medication Sig Dispense Refill    aspirin (ECOTRIN LOW STRENGTH) 81 mg EC tablet Take 81 mg by mouth daily      gabapentin (NEURONTIN) 100 mg capsule Take 500 mg by mouth 3 (three) times a day       glimepiride (AMARYL) 4 mg tablet Take 1 tablet (4 mg total) by mouth 2 (two) times a day Take 1 tablet by mouth once daily 180 tablet 1    glucose blood (EVERETT CONTOUR TEST) test strip every 24 hours      metFORMIN (GLUCOPHAGE) 1000 MG tablet Take 1 tablet (1,000 mg total) by mouth 2 (two) times a day with meals 180 tablet 1    nortriptyline (PAMELOR) 25 mg capsule Take 25 mg by mouth daily      omeprazole (PriLOSEC) 40 MG capsule Take 40 mg by mouth daily      pioglitazone (ACTOS) 45 mg tablet Take 1 tablet (45 mg total) by mouth daily 30 tablet 2    rosuvastatin (CRESTOR) 5 mg tablet Take 5 mg by mouth daily      traMADol (ULTRAM) 50 mg tablet Take 50 mg by mouth every 6 (six) hours       No current facility-administered medications for this visit  Allergies   Allergen Reactions    Penicillin G      Other reaction(s): Unknown       Review of Systems    Vitals:    12/28/20 1030   Weight: 109 kg (240 lb)   Height: 5' 10" (1 778 m)         I spent 20 minutes directly with the patient during this visit    VIRTUAL VISIT DISCLAIMER    Abdi Rodrigues acknowledges that he has consented to an online visit or consultation  He understands that the online visit is based solely on information provided by him, and that, in the absence of a face-to-face physical evaluation by the physician, the diagnosis he receives is both limited and provisional in terms of accuracy and completeness  This is not intended to replace a full medical face-to-face evaluation by the physician  Abdi Rodrigues understands and accepts these terms

## 2021-01-11 ENCOUNTER — TELEPHONE (OUTPATIENT)
Dept: GASTROENTEROLOGY | Facility: CLINIC | Age: 71
End: 2021-01-11

## 2021-01-11 ENCOUNTER — TELEMEDICINE (OUTPATIENT)
Dept: GASTROENTEROLOGY | Facility: CLINIC | Age: 71
End: 2021-01-11

## 2021-01-11 VITALS — HEIGHT: 70 IN | WEIGHT: 240 LBS | BODY MASS INDEX: 34.36 KG/M2

## 2021-01-11 DIAGNOSIS — D50.9 IRON DEFICIENCY ANEMIA, UNSPECIFIED IRON DEFICIENCY ANEMIA TYPE: ICD-10-CM

## 2021-01-11 DIAGNOSIS — Z86.010 PERSONAL HISTORY OF COLONIC POLYPS: ICD-10-CM

## 2021-01-11 DIAGNOSIS — K21.9 GASTROESOPHAGEAL REFLUX DISEASE, UNSPECIFIED WHETHER ESOPHAGITIS PRESENT: Primary | ICD-10-CM

## 2021-01-11 RX ORDER — PREGABALIN 25 MG/1
50 CAPSULE ORAL 3 TIMES DAILY
COMMUNITY
Start: 2020-12-14 | End: 2022-04-27

## 2021-01-11 NOTE — TELEPHONE ENCOUNTER
Hi girls, this came into my been to sign but I am unable to sign the encounter? Are you able to look into this? Thank you

## 2021-01-11 NOTE — PROGRESS NOTES
Why does your doctor want you to have this procedure? GERD/HX  OF POLYPS    Do you have kidney disease?  no  If yes, are you on dialysis :     Have you had diverticulitis within the past 2 months? no    Are you diabetic?  yes  If yes, insulin dependent: If yes, provide diabetic instructions sheet     Do take iron supplements?  no  If yes, instruct patient to hold iron supplement for 7 days prior    Are you on a blood thinner? no   Was the blood thinner sheet complete and faxed to cardiologist no  Plavix (clopidogrel), Coumadin (warfarin), Lovenox (enoxaparin), Xarelto (rivaroxaban), Pradaxa(dabigatran), Eliquis(apixaban) Savaysa/Lixiana (edoxapan)    Do you have an automatic implantable cardiac defibrillator (AICD)/pacemaker ([facility])? no  Was AICD/pacemaker sheet completed and faxed to cardiologist? no    Are you on home oxygen? no  If yes, continuous or nocturnal:     Have you been treated for MRSA, VRE or any communicable diseases? no    Heart attack, stroke, or stent within 3 months? no  Schedule at Hospital if within 3-6 months   Use nitroglycerin for chest pain in the last 6 months? no    History of organ  transplant?  no   If yes, notify Endo      History of neck/throat/tongue surgery or cancer? no  IF yes, notify Endo      Any problems with anesthesia in the past? no     Was stool C diff ordered?  no Stool specimen needs to be completed prior to procedure    Do have any facial or body piercings?no     Do you have a latex allergy? no     Do have an allergy to metals? (Bravo study only) no     If pediatric patient, was consent faxed to pediatrician no     Patient rights reviewed yes     Rx sent for Suprep to provider for signature  Instructions emailed to patient

## 2021-01-18 ENCOUNTER — ANESTHESIA EVENT (OUTPATIENT)
Dept: GASTROENTEROLOGY | Facility: AMBULATORY SURGERY CENTER | Age: 71
End: 2021-01-18

## 2021-01-18 ENCOUNTER — ANESTHESIA (OUTPATIENT)
Dept: GASTROENTEROLOGY | Facility: AMBULATORY SURGERY CENTER | Age: 71
End: 2021-01-18

## 2021-01-18 ENCOUNTER — HOSPITAL ENCOUNTER (OUTPATIENT)
Dept: GASTROENTEROLOGY | Facility: AMBULATORY SURGERY CENTER | Age: 71
Discharge: HOME | End: 2021-01-18
Payer: MEDICARE

## 2021-01-18 VITALS
TEMPERATURE: 97.7 F | HEART RATE: 64 BPM | DIASTOLIC BLOOD PRESSURE: 64 MMHG | OXYGEN SATURATION: 97 % | SYSTOLIC BLOOD PRESSURE: 121 MMHG | RESPIRATION RATE: 14 BRPM

## 2021-01-18 VITALS — HEART RATE: 57 BPM

## 2021-01-18 DIAGNOSIS — Z86.010 PERSONAL HISTORY OF COLONIC POLYPS: ICD-10-CM

## 2021-01-18 DIAGNOSIS — K21.9 GASTROESOPHAGEAL REFLUX DISEASE, UNSPECIFIED WHETHER ESOPHAGITIS PRESENT: ICD-10-CM

## 2021-01-18 DIAGNOSIS — D50.9 IRON DEFICIENCY ANEMIA, UNSPECIFIED IRON DEFICIENCY ANEMIA TYPE: ICD-10-CM

## 2021-01-18 PROCEDURE — 45378 DIAGNOSTIC COLONOSCOPY: CPT | Performed by: INTERNAL MEDICINE

## 2021-01-18 PROCEDURE — 43239 EGD BIOPSY SINGLE/MULTIPLE: CPT | Performed by: INTERNAL MEDICINE

## 2021-01-18 PROCEDURE — 88305 TISSUE EXAM BY PATHOLOGIST: CPT | Performed by: PATHOLOGY

## 2021-01-18 RX ORDER — SODIUM CHLORIDE 9 MG/ML
50 INJECTION, SOLUTION INTRAVENOUS CONTINUOUS
Status: DISCONTINUED | OUTPATIENT
Start: 2021-01-18 | End: 2021-01-18

## 2021-01-18 RX ORDER — PROPOFOL 10 MG/ML
INJECTION, EMULSION INTRAVENOUS AS NEEDED
Status: DISCONTINUED | OUTPATIENT
Start: 2021-01-18 | End: 2021-01-18

## 2021-01-18 RX ADMIN — SODIUM CHLORIDE 50 ML/HR: 9 INJECTION, SOLUTION INTRAVENOUS at 12:57

## 2021-01-18 RX ADMIN — PROPOFOL 50 MG: 10 INJECTION, EMULSION INTRAVENOUS at 13:24

## 2021-01-18 RX ADMIN — PROPOFOL 130 MG: 10 INJECTION, EMULSION INTRAVENOUS at 13:13

## 2021-01-18 RX ADMIN — PROPOFOL 30 MG: 10 INJECTION, EMULSION INTRAVENOUS at 13:16

## 2021-01-18 RX ADMIN — PROPOFOL 40 MG: 10 INJECTION, EMULSION INTRAVENOUS at 13:22

## 2021-01-18 RX ADMIN — PROPOFOL 50 MG: 10 INJECTION, EMULSION INTRAVENOUS at 13:28

## 2021-01-18 NOTE — ANESTHESIA POSTPROCEDURE EVALUATION
Post-Op Assessment Note    CV Status:  Stable  Pain Score: 0    Pain management: adequate     Mental Status:  Alert and awake   Hydration Status:  Euvolemic   PONV Controlled:  Controlled   Airway Patency:  Patent      Post Op Vitals Reviewed: Yes      Staff: Anesthesiologist         No complications documented      BP     Temp      Pulse     Resp     SpO2

## 2021-01-18 NOTE — INTERVAL H&P NOTE
H&P reviewed  After examining the patient I find no changes in the patients condition since the H&P had been written  Physical exam:  Chest clear to auscultation  Heart regular rate rhythm  Abdomen normoactive bowel sounds soft    Nontender  Vitals:    01/18/21 1243   BP: (!) 171/77   Pulse: 74   Resp: (!) 24   Temp: 97 7 °F (36 5 °C)   SpO2: 96%

## 2021-01-18 NOTE — DISCHARGE INSTRUCTIONS
Gastritis   WHAT YOU NEED TO KNOW:   What is gastritis? Gastritis is inflammation or irritation of the lining of your stomach  What increases my risk for gastritis? · Infection with bacteria, a virus, or a parasite    · NSAIDs, aspirin, or steroid medicine    · Use of tobacco products or alcohol    · Trauma such as an injury to your stomach or intestine    · Autoimmune disorders such as diabetes, thyroid disease, or Crohn disease    · Stress    · Age older than 60 years    · Illegal drugs, such as cocaine    What are the signs and symptoms of gastritis? · Stomach pain, burning, or tenderness when you press on it    · Stomach fullness or tightness    · Nausea or vomiting    · Loss of appetite, or feeling full quickly when you eat    · Bad breath    · Fatigue or feeling more tired than usual    · Heartburn    How is gastritis diagnosed? Your healthcare provider will ask about your signs and symptoms and examine you  You may need any of the following:  · Blood tests  may be used to show an infection, dehydration, or anemia (low red blood cell levels)  · A bowel movement sample  may be tested for blood or the germ that may be causing your gastritis  · A breath test  may show if H pylori is causing your gastritis  You will be given a liquid to drink  Then you will breathe into a bag  Your healthcare provider will measure the amount of carbon dioxide in your breath  Extra amounts of carbon dioxide may mean you have an H pylori infection  · An endoscopy  may be used to look for irritation or bleeding in your stomach  Your healthcare provider will use an endoscope (tube with a light and camera on the end) during the procedure  He or she may take a sample from your stomach to be tested  How is gastritis treated? Your symptoms may go away without treatment  Treatment will depend on what is causing your gastritis  Your healthcare provider may recommend changes to the medicines you take   Medicines may be given to help treat a bacterial infection or decrease stomach acid  How can I manage or prevent gastritis? · Do not smoke  Nicotine and other chemicals in cigarettes and cigars can make your symptoms worse and cause lung damage  Ask your healthcare provider for information if you currently smoke and need help to quit  E-cigarettes or smokeless tobacco still contain nicotine  Talk to your healthcare provider before you use these products  · Do not drink alcohol  Alcohol can prevent healing and make your gastritis worse  Talk to your healthcare provider if you need help to stop drinking  · Do not take NSAIDs or aspirin unless directed  These and similar medicines can cause irritation of your stomach lining  If your healthcare provider says it is okay to take NSAIDs, take them with food  · Do not eat foods that cause irritation  Foods such as oranges and salsa can cause burning or pain  Eat a variety of healthy foods  Examples include fruits (not citrus), vegetables, low-fat dairy products, beans, whole-grain breads, and lean meats and fish  Try to eat small meals, and drink water with your meals  Do not eat for at least 3 hours before you go to bed  · Find ways to relax and decrease stress  Stress can increase stomach acid and make gastritis worse  Activities such as yoga, meditation, or listening to music can help you relax  Spend time with friends, or do things you enjoy  Call 911 for any of the following:   · You develop chest pain or shortness of breath  When should I seek immediate care? · You vomit blood  · You have black or bloody bowel movements  · You have severe stomach or back pain  When should I contact my healthcare provider? · You have a fever  · You have new or worsening symptoms, even after treatment  · You have questions or concerns about your condition or care  CARE AGREEMENT:   You have the right to help plan your care   Learn about your health condition and how it may be treated  Discuss treatment options with your healthcare providers to decide what care you want to receive  You always have the right to refuse treatment  The above information is an  only  It is not intended as medical advice for individual conditions or treatments  Talk to your doctor, nurse or pharmacist before following any medical regimen to see if it is safe and effective for you  © Copyright [address] Information is for End User's use only and may not be sold, redistributed or otherwise used for commercial purposes  All illustrations and images included in CareNotes® are the copyrighted property of [employer]  or [address] [place] [address]   WHAT YOU NEED TO KNOW:   What is Berg esophagus? Berg esophagus is a condition that is also called intestinal metaplasia  Cells that line your esophagus change into cells that are like intestine cells  The change increases your risk for esophageal cancer  What increases my risk for Berg esophagus? The exact cause of Berg esophagus is not known  The following may increase your risk:  · Long-term gastroesophageal reflux disease (GERD)    · Age older than 48    · A family history of GERD, Berg esophagus, or esophageal cancer    · Obesity    · Smoking cigarettes    What are the signs and symptoms of Berg esophagus? Signs and symptoms are usually related to the signs and symptoms of GERD  You may have any of the following:  · Heartburn (burning pain in your chest)    · Pain after meals that spreads to your neck, jaw, or shoulder    · Pain that gets better when you change positions    · Bitter or acid taste in your mouth    · A dry cough    · Trouble swallowing or pain with swallowing    · Hoarseness or a sore throat    · Burping or hiccups    · Feeling full soon after you start eating    How is Berg esophagus diagnosed?   An endoscopy is a procedure used to see the inside of your esophagus and stomach  A scope is a long, bendable tube with a light on the end of it  A camera may be hooked to the scope  Your healthcare provider may also take tissue samples to be tested for dysplasia (abnormal changes in your cells and tissues)  If dysplasia is found, it will be given a grade to describe the amount of change  The grade can range from negative (no dysplasia) to high-grade (a large amount)  You have a higher risk for cancer with high-grade dysplasia  How is Berg esophagus treated? Treatment depends on the grade of dysplasia  The goal of treatment is to control your symptoms and prevent esophageal cancer  Your healthcare provider may also suggest that you make changes in the foods you eat and in your lifestyle  You may need any of the following:  · Anti-reflux medicines  help decrease the stomach acid that can irritate your esophagus and stomach  These medicines may include proton pump inhibitors (PPI) and histamine type-2 receptor (H2) blockers  You may also be given medicines to stop vomiting  · Surgery or other procedures  may be done if you have high-grade dysplasia  Abnormal cells may be killed with heat or by freezing them  Light may be used to kill abnormal cells  It is used in combination with medicines that make the abnormal cells sensitive to light  Surgery may be used to wrap the upper part of your stomach around the esophageal sphincter to strengthen it  Surgery may be needed to remove part or all of your esophagus  What can I do to manage Berg esophagus? · Do not eat foods that make your symptoms worse  Examples are chocolate, garlic, onions, spicy or fatty foods, citrus fruits (oranges), and tomato-based foods (spaghetti sauce)  Do not drink alcohol, drinks that contain caffeine, or carbonated drinks, such as soda  Ask your healthcare provider if there are other foods and drinks you should not have  · Maintain a healthy weight    If you are overweight, weight loss may help relieve symptoms  Ask your healthcare provider about safe ways to lose weight  · Do not smoke  If you smoke, it is never too late to quit  Smoking may worsen acid reflux  Ask your healthcare provider for information if you need help quitting  Where can I get support and more information? · [address]  [address]  Phone: [phone]  Web Address: http://[employer]/  org    Call your local emergency number (911 in the [address]) if:   · You have severe chest pain and shortness of breath  When should I seek immediate care? · Your bowel movements are black, bloody, or tarry  · Your vomit looks like coffee grounds or has blood in it  When should I call my doctor? · Your symptoms do not improve with treatment  · You have questions or concerns about your condition or care  CARE AGREEMENT:   You have the right to help plan your care  Learn about your health condition and how it may be treated  Discuss treatment options with your healthcare providers to decide what care you want to receive  You always have the right to refuse treatment  The above information is an  only  It is not intended as medical advice for individual conditions or treatments  Talk to your doctor, nurse or pharmacist before following any medical regimen to see if it is safe and effective for you  © Copyright [address] Information is for End User's use only and may not be sold, redistributed or otherwise used for commercial purposes  All illustrations and images included in CareNotes® are the copyrighted property of [employer]  or [facility] Miguel  Hemorrhoids   WHAT YOU NEED TO KNOW:   What are hemorrhoids? Hemorrhoids are swollen blood vessels inside your rectum (internal hemorrhoids) or on your anus (external hemorrhoids)  Sometimes a hemorrhoid may prolapse  This means it extends out of your anus  What increases my risk for hemorrhoids? · Pregnancy or obesity    · Straining or sitting for a long time during bowel movements    · Liver disease    · Weak muscles around the anus caused by older age, rectal surgery, or anal intercourse    · A lack of physical activity    · Chronic diarrhea or constipation    · A low-fiber diet    What are the signs and symptoms of hemorrhoids? · Pain or itching around your anus or inside your rectum    · Swelling or bumps around your anus    · Bright red blood in your bowel movement, on the toilet paper, or in the toilet bowl    · Tissue bulging out of your anus (prolapsed hemorrhoids)    · Incontinence (poor control over urine or bowel movements)    How are hemorrhoids diagnosed? Your healthcare provider will ask about your symptoms, the foods you eat, and your bowel movements  He or she will examine your anus for external hemorrhoids  You may need the following:  · A digital rectal exam  is a test to check for hemorrhoids  Your healthcare provider will put a gloved finger inside your anus to feel for the hemorrhoids  · An anoscopy  is a test that uses a scope (small tube with a light and camera on the end) to look at your hemorrhoids  How are hemorrhoids treated? Treatment will depend on your symptoms  You may need any of the following:  · Medicines  can help decrease pain and swelling, and soften your bowel movement  The medicine may be a pill, pad, cream, or ointment  · Procedures  may be used to shrink or remove your hemorrhoid  Examples include rubber-band ligation, sclerotherapy, and photocoagulation  These procedures may be done in your healthcare provider's office  Ask your healthcare provider for more information about these procedures  · Surgery  may be needed to shrink or remove your hemorrhoids  How can I manage my symptoms? · Apply ice on your anus for 15 to 20 minutes every hour or as directed  Use an ice pack, or put crushed ice in a plastic bag   Cover it with a towel before you apply it to your anus  Ice helps prevent tissue damage and decreases swelling and pain  · Take a sitz bath  Fill a bathtub with 4 to 6 inches of warm water  You may also use a sitz bath pan that fits inside a toilet bowl  Sit in the sitz bath for 15 minutes  Do this 3 times a day, and after each bowel movement  The warm water can help decrease pain and swelling  · Keep your anal area clean  Gently wash the area with warm water daily  Soap may irritate the area  After a bowel movement, wipe with moist towelettes or wet toilet paper  Dry toilet paper can irritate the area  How can I help prevent hemorrhoids? · Do not strain to have a bowel movement  Do not sit on the toilet too long  These actions can increase pressure on the tissues in your rectum and anus  · Drink plenty of liquids  Liquids can help prevent constipation  Ask how much liquid to drink each day and which liquids are best for you  · Eat a variety of high-fiber foods  Examples include fruits, vegetables, and whole grains  Ask your healthcare provider how much fiber you need each day  You may need to take a fiber supplement  · Exercise as directed  Exercise, such as walking, may make it easier to have a bowel movement  Ask your healthcare provider to help you create an exercise plan  · Do not have anal sex  Anal sex can weaken the skin around your rectum and anus  · Avoid heavy lifting  This can cause straining and increase your risk for another hemorrhoid  When should I seek immediate care? · You have severe pain in your rectum or around your anus  · You have severe pain in your abdomen and you are vomiting  · You have bleeding from your anus that soaks through your underwear  When should I contact my healthcare provider? · You have frequent and painful bowel movements  · Your hemorrhoid looks or feels more swollen than usual      · You do not have a bowel movement for 2 days or more  · You see or feel tissue coming through your anus  · You have questions or concerns about your condition or care  CARE AGREEMENT:   You have the right to help plan your care  Learn about your health condition and how it may be treated  Discuss treatment options with your healthcare providers to decide what care you want to receive  You always have the right to refuse treatment  The above information is an  only  It is not intended as medical advice for individual conditions or treatments  Talk to your doctor, nurse or pharmacist before following any medical regimen to see if it is safe and effective for you  © Copyright [address] [place] [address] Information is for End User's use only and may not be sold, redistributed or otherwise used for commercial purposes   All illustrations and images included in CareNotes® are the copyrighted property of [employer]  or [address] [place] [address]

## 2021-01-18 NOTE — ANESTHESIA PREPROCEDURE EVALUATION
Procedure:  COLONOSCOPY  EGD    Relevant Problems   CARDIO   (+) Hyperlipidemia      ENDO   (+) Type 2 diabetes mellitus with diabetic polyneuropathy, without long-term current use of insulin (HCC)      GI/HEPATIC   (+) GERD (gastroesophageal reflux disease)      HEMATOLOGY   (+) Iron deficiency anemia      NEURO/PSYCH   (+) Personal history of colonic polyps        Physical Exam    Airway    Mallampati score: I  TM Distance: >3 FB  Neck ROM: full     Dental   lower dentures,     Cardiovascular  Cardiovascular exam normal    Pulmonary  Pulmonary exam normal     Other Findings        Anesthesia Plan  ASA Score- 3     Anesthesia Type- IV sedation with anesthesia with ASA Monitors  Additional Monitors:   Airway Plan:           Plan Factors-    Chart reviewed  Patient summary reviewed  Induction- intravenous  Postoperative Plan-     Informed Consent- Anesthetic plan and risks discussed with patient

## 2021-01-27 ENCOUNTER — TELEPHONE (OUTPATIENT)
Dept: GASTROENTEROLOGY | Facility: CLINIC | Age: 71
End: 2021-01-27

## 2021-01-27 LAB
BASOPHILS # BLD AUTO: 0 X10E3/UL (ref 0–0.2)
BASOPHILS NFR BLD AUTO: 0 %
EOSINOPHIL # BLD AUTO: 0.1 X10E3/UL (ref 0–0.4)
EOSINOPHIL NFR BLD AUTO: 2 %
ERYTHROCYTE [DISTWIDTH] IN BLOOD BY AUTOMATED COUNT: 17.9 % (ref 11.6–15.4)
HCT VFR BLD AUTO: 33.9 % (ref 37.5–51)
HGB BLD-MCNC: 10.7 G/DL (ref 13–17.7)
IMM GRANULOCYTES # BLD: 0 X10E3/UL (ref 0–0.1)
IMM GRANULOCYTES NFR BLD: 0 %
IRON SATN MFR SERPL: 7 % (ref 15–55)
IRON SERPL-MCNC: 36 UG/DL (ref 38–169)
LYMPHOCYTES # BLD AUTO: 1.9 X10E3/UL (ref 0.7–3.1)
LYMPHOCYTES NFR BLD AUTO: 36 %
MCH RBC QN AUTO: 25.2 PG (ref 26.6–33)
MCHC RBC AUTO-ENTMCNC: 31.6 G/DL (ref 31.5–35.7)
MCV RBC AUTO: 80 FL (ref 79–97)
MONOCYTES # BLD AUTO: 0.5 X10E3/UL (ref 0.1–0.9)
MONOCYTES NFR BLD AUTO: 9 %
NEUTROPHILS # BLD AUTO: 2.7 X10E3/UL (ref 1.4–7)
NEUTROPHILS NFR BLD AUTO: 53 %
PLATELET # BLD AUTO: 192 X10E3/UL (ref 150–450)
RBC # BLD AUTO: 4.24 X10E6/UL (ref 4.14–5.8)
TIBC SERPL-MCNC: 482 UG/DL (ref 250–450)
UIBC SERPL-MCNC: 446 UG/DL (ref 111–343)
WBC # BLD AUTO: 5.2 X10E3/UL (ref 3.4–10.8)

## 2021-01-27 NOTE — TELEPHONE ENCOUNTER
Per CBC labs [facility] said to call patient that he was still iron deficient anemia and needs OTC iron supplement  Patient was advised     (I accidentally closed result w/o documenting this)

## 2021-02-02 DIAGNOSIS — E11.8 TYPE 2 DIABETES MELLITUS WITH COMPLICATION (HCC): ICD-10-CM

## 2021-02-02 RX ORDER — PIOGLITAZONEHYDROCHLORIDE 45 MG/1
TABLET ORAL
Qty: 90 TABLET | Refills: 1 | Status: SHIPPED | OUTPATIENT
Start: 2021-02-02 | End: 2021-06-01 | Stop reason: SDUPTHER

## 2021-02-23 LAB
ALBUMIN SERPL-MCNC: 4.3 G/DL (ref 3.8–4.8)
ALBUMIN/CREAT UR: 6 MG/G CREAT (ref 0–29)
ALBUMIN/GLOB SERPL: 1.6 {RATIO} (ref 1.2–2.2)
ALP SERPL-CCNC: 128 IU/L (ref 39–117)
ALT SERPL-CCNC: 15 IU/L (ref 0–44)
AST SERPL-CCNC: 19 IU/L (ref 0–40)
BILIRUB SERPL-MCNC: <0.2 MG/DL (ref 0–1.2)
BUN SERPL-MCNC: 19 MG/DL (ref 8–27)
BUN/CREAT SERPL: 17 (ref 10–24)
CALCIUM SERPL-MCNC: 9.7 MG/DL (ref 8.6–10.2)
CHLORIDE SERPL-SCNC: 104 MMOL/L (ref 96–106)
CO2 SERPL-SCNC: 26 MMOL/L (ref 20–29)
CREAT SERPL-MCNC: 1.1 MG/DL (ref 0.76–1.27)
CREAT UR-MCNC: 197.2 MG/DL
GLOBULIN SER-MCNC: 2.7 G/DL (ref 1.5–4.5)
GLUCOSE SERPL-MCNC: 117 MG/DL (ref 65–99)
HBA1C MFR BLD: 7 % (ref 4.8–5.6)
MICROALBUMIN UR-MCNC: 11.3 UG/ML
POTASSIUM SERPL-SCNC: 4.7 MMOL/L (ref 3.5–5.2)
PROT SERPL-MCNC: 7 G/DL (ref 6–8.5)
SL AMB EGFR AFRICAN AMERICAN: 79 ML/MIN/1.73
SL AMB EGFR NON AFRICAN AMERICAN: 68 ML/MIN/1.73
SODIUM SERPL-SCNC: 143 MMOL/L (ref 134–144)

## 2021-03-02 ENCOUNTER — OFFICE VISIT (OUTPATIENT)
Dept: ENDOCRINOLOGY | Facility: HOSPITAL | Age: 71
End: 2021-03-02
Payer: MEDICARE

## 2021-03-02 ENCOUNTER — TELEPHONE (OUTPATIENT)
Dept: ADMINISTRATIVE | Facility: OTHER | Age: 71
End: 2021-03-02

## 2021-03-02 VITALS
BODY MASS INDEX: 36.13 KG/M2 | WEIGHT: 252.4 LBS | HEART RATE: 72 BPM | HEIGHT: 70 IN | SYSTOLIC BLOOD PRESSURE: 136 MMHG | DIASTOLIC BLOOD PRESSURE: 68 MMHG

## 2021-03-02 DIAGNOSIS — E78.5 HYPERLIPIDEMIA, UNSPECIFIED HYPERLIPIDEMIA TYPE: ICD-10-CM

## 2021-03-02 DIAGNOSIS — E11.42 TYPE 2 DIABETES MELLITUS WITH DIABETIC POLYNEUROPATHY, WITHOUT LONG-TERM CURRENT USE OF INSULIN (HCC): Primary | ICD-10-CM

## 2021-03-02 PROCEDURE — 99214 OFFICE O/P EST MOD 30 MIN: CPT | Performed by: PHYSICIAN ASSISTANT

## 2021-03-02 RX ORDER — FUROSEMIDE 20 MG/1
20 TABLET ORAL DAILY
COMMUNITY
Start: 2021-01-28

## 2021-03-02 RX ORDER — PNV NO.95/FERROUS FUM/FOLIC AC 28MG-0.8MG
TABLET ORAL
COMMUNITY

## 2021-03-02 NOTE — TELEPHONE ENCOUNTER
Upon review of the In Basket request and the patient's chart, initial outreach has been made via fax, please see Contacts section for details       Thank you  Delfino Castelan MA

## 2021-03-02 NOTE — PROGRESS NOTES
Seth Reyes 73 y o  male MRN: [id number]    Encounter: [id number]      Assessment/Plan     Assessment: This is a 73y o -year-old male with type 2 diabetes with hyperlipidemia  Plan:  1  Type 2 diabetes:    Most recent hemoglobin A1c has decreased to 7 0  At this time will continue with Actos 45 mg daily, metformin 1000 mg twice a day, glimepiride 4 mg twice a day  Continue to monitor diet  Make sure to continue checking blood sugar 3 to 4 times a day  Continue following up with Podiatry for diabetic foot care  Follow-up in this office in 3 months with lab work completed prior to visit      2  Hyperlipidemia:  Lipid panel was normal   Continue with Crestor at this time  CC:  Type 2 diabetes follow-up    History of Present Illness     HPI:  80 year old male with type 2 diabetes for about 20 years   He is on oral agents at home and takes Actos 45 mg daily, Metformin 1000 twice daily, Glimepiride 4 mg twice a day   His most recent hemoglobin A1c from October 30, 2020 is 8 0   He denies any episodes of hypoglycemia, polyuria, polydipsia, nocturia and blurry vision   He denies nephropathy and retinopathy but does admit to neuropathy    He has a history of a diabetic foot ulcer and follows Dr Mary Martinez for regular diabetic foot care   has recovered from his episode of sepsis and complications of diabetic foot ulcer at the end of November beginning of December  Continues to follow up with Podiatry, and needs a new diabetic shoes  He denies retinopathy or blurry vision but states that he is due for a diabetic eye exam   His most recent diabetic foot exam was performed on February 6, 2020       Blood Sugar/Glucometer/Pump/CGM review:   Reviewed blood sugar logs from December 7, 2020 through February 28, 2021 shows a fasting blood sugar ranging between 80 and 200, but typically in the low 100s  During the day his blood sugar can range from 70 to 200, but is typically not higher than 140   Prior to bed his blood sugars can run a bit higher and are checked less frequently, but can range from 120 to over 200      For his hyperlipidemia, he is treated with Crestor 5 mg daily  Review of Systems   Constitutional: Negative for activity change, appetite change, fatigue and unexpected weight change  HENT: Negative for trouble swallowing  Eyes: Negative for visual disturbance  Respiratory: Negative for chest tightness and shortness of breath  Cardiovascular: Negative for chest pain, palpitations and leg swelling  Gastrointestinal: Negative for abdominal pain, diarrhea, nausea and vomiting  Endocrine: Negative for cold intolerance, heat intolerance, polydipsia, polyphagia and polyuria  Genitourinary: Negative for frequency  Skin: Negative for rash and wound  Neurological: Negative for dizziness, weakness, light-headedness, numbness and headaches  Psychiatric/Behavioral: Negative for dysphoric mood and sleep disturbance  The patient is not nervous/anxious          Historical Information   Past Medical History:   Diagnosis Date    Charcot ankle, left     Diabetes mellitus ([facility] [address] )     Diabetic neuropathy (HCC)     GERD (gastroesophageal reflux disease) 12/28/2020    Hyperlipidemia     Iron deficiency anemia 12/28/2020    Personal history of colonic polyps 12/28/2020    Last colonoscopy 2016     Past Surgical History:   Procedure Laterality Date    ANKLE SURGERY Left     CARPAL TUNNEL RELEASE      REPLACEMENT TOTAL KNEE Left      Social History   Social History     Substance and Sexual Activity   Alcohol Use Yes    Frequency: Monthly or less     Social History     Substance and Sexual Activity   Drug Use Never     Social History     Tobacco Use   Smoking Status Former Smoker    Packs/day: 0 75    Years: 15 00    Pack years: 11 25    Types: Cigarettes    Start date: 8/14/1983   Smokeless Tobacco Never Used     Family History:   Family History   Problem Relation Age of Onset    Hypertension Mother     No Known Problems Father     Diabetes type II Sister     No Known Problems Brother     Diabetes type II Sister     Colon cancer Neg Hx     Colon polyps Neg Hx        Meds/Allergies   Current Outpatient Medications   Medication Sig Dispense Refill    aspirin (ECOTRIN LOW STRENGTH) 81 mg EC tablet Take 81 mg by mouth daily      Ferrous Sulfate (Iron) 325 (65 Fe) MG TABS Take by mouth      furosemide (LASIX) 20 mg tablet Take 20 mg by mouth daily      gabapentin (NEURONTIN) 100 mg capsule Take 500 mg by mouth 3 (three) times a day       glimepiride (AMARYL) 4 mg tablet Take 1 tablet (4 mg total) by mouth 2 (two) times a day Take 1 tablet by mouth once daily 180 tablet 1    glucose blood (EEVRETT CONTOUR TEST) test strip every 24 hours      metFORMIN (GLUCOPHAGE) 1000 MG tablet Take 1 tablet (1,000 mg total) by mouth 2 (two) times a day with meals 180 tablet 1    pioglitazone (ACTOS) 45 mg tablet TAKE 1 TABLET BY MOUTH DAILY 90 tablet 1    pregabalin (LYRICA) 25 mg capsule Take 50 mg by mouth 3 (three) times a day      rosuvastatin (CRESTOR) 5 mg tablet Take 5 mg by mouth daily      traMADol (ULTRAM) 50 mg tablet Take 50 mg by mouth every 6 (six) hours      nortriptyline (PAMELOR) 25 mg capsule Take 25 mg by mouth daily      omeprazole (PriLOSEC) 40 MG capsule Take 40 mg by mouth daily       No current facility-administered medications for this visit  Allergies   Allergen Reactions    Penicillin G      Other reaction(s): Unknown       Objective   Vitals: Blood pressure 136/68, pulse 72, height 5' 10" (1 778 m), weight 114 kg (252 lb 6 4 oz)  Physical Exam  Vitals signs and nursing note reviewed  Constitutional:       General: He is not in acute distress  Appearance: Normal appearance  He is not diaphoretic  HENT:      Head: Normocephalic and atraumatic  Eyes:      General: No scleral icterus  Extraocular Movements: Extraocular movements intact        Conjunctiva/sclera: Conjunctivae normal       Pupils: Pupils are equal, round, and reactive to light  Cardiovascular:      Rate and Rhythm: Normal rate and regular rhythm  Heart sounds: No murmur  Pulmonary:      Effort: Pulmonary effort is normal  No respiratory distress  Breath sounds: Normal breath sounds  No wheezing  Musculoskeletal:      Right lower leg: No edema  Left lower leg: No edema  Lymphadenopathy:      Cervical: No cervical adenopathy  Skin:     General: Skin is warm and dry  Neurological:      Mental Status: He is alert and oriented to person, place, and time  Mental status is at baseline  Sensory: No sensory deficit  Gait: Gait abnormal (Uses scooter to help with ambulation  )  Psychiatric:         Mood and Affect: Mood normal          Behavior: Behavior normal          Thought Content: Thought content normal          The history was obtained from the review of the chart, patient      Lab Results:   Lab Results   Component Value Date/Time    Hemoglobin A1C 7 0 (H) 02/22/2021 09:16 AM    Hemoglobin A1C 8 0 (H) 10/30/2020 10:05 AM    Hemoglobin A1C 7 8 (H) 05/20/2020 10:25 AM    White Blood Cell Count 5 2 01/26/2021 01:35 PM    Hemoglobin 10 7 (L) 01/26/2021 01:35 PM    HCT 33 9 (L) 01/26/2021 01:35 PM    MCV 80 01/26/2021 01:35 PM    Platelet Count 381 62/66/2444 01:35 PM    BUN 19 02/22/2021 09:16 AM    BUN 12 10/30/2020 10:05 AM    BUN 17 05/20/2020 10:25 AM    Potassium 4 7 02/22/2021 09:16 AM    Potassium 4 7 10/30/2020 10:05 AM    Potassium 4 9 05/20/2020 10:25 AM    Chloride 104 02/22/2021 09:16 AM    Chloride 96 10/30/2020 10:05 AM    Chloride 99 05/20/2020 10:25 AM    CO2 26 02/22/2021 09:16 AM    CO2 29 10/30/2020 10:05 AM    CO2 27 05/20/2020 10:25 AM    Creatinine 1 10 02/22/2021 09:16 AM    Creatinine 0 76 10/30/2020 10:05 AM    Creatinine 0 89 05/20/2020 10:25 AM    AST 19 02/22/2021 09:16 AM    AST 14 10/30/2020 10:05 AM    AST 14 05/20/2020 10:25 AM    ALT 15 02/22/2021 09:16 AM    ALT 11 10/30/2020 10:05 AM    ALT 13 05/20/2020 10:25 AM    Albumin 4 3 02/22/2021 09:16 AM    Albumin 3 0 (L) 10/30/2020 10:05 AM    Albumin 3 9 05/20/2020 10:25 AM    Globulin, Total 2 7 02/22/2021 09:16 AM    Globulin, Total 3 2 10/30/2020 10:05 AM    Globulin, Total 2 6 05/20/2020 10:25 AM    HDL 34 (L) 10/30/2020 10:05 AM    Triglycerides 55 10/30/2020 10:05 AM         Portions of the record may have been created with voice recognition software  Occasional wrong word or "sound a like" substitutions may have occurred due to the inherent limitations of voice recognition software  Read the chart carefully and recognize, using context, where substitutions have occurred

## 2021-03-02 NOTE — PATIENT INSTRUCTIONS
Continue monitor diet, and maintain physical activity  Make sure to drink plenty water throughout the day      Check your blood sugars regularly      Continue Actos, glimepiride, and Metformin at current dose      Please continue to check your blood sugars at least twice daily at alternating times and send a record to the office in 2 weeks for review      Continue to follow up with Dr Pedro Torres for Podiatry      Follow up with Ophthalmology for a diabetic eye exam      Continue Crestor      Consider follow up with medical nutrition therapy for help with your diet

## 2021-03-02 NOTE — TELEPHONE ENCOUNTER
----- Message from Jean Claude Rasmussen sent at 3/2/2021  3:48 PM EST -----  Regarding: diabetic eye exam  03/02/21 3:49 PM    Hello, our patient Fabby Dailey has had a diabetic eye exam completed/performed  Please assist in updating the patient chart by JERSON Newby  The date of service is 2020      Thank you,  Jass Doan MA  [facility] [place] [facility]

## 2021-03-03 ENCOUNTER — TELEPHONE (OUTPATIENT)
Dept: ADMINISTRATIVE | Facility: OTHER | Age: 71
End: 2021-03-03

## 2021-03-03 NOTE — TELEPHONE ENCOUNTER
Upon review of the In Basket request and the patient's chart, initial outreach has been made via fax, please see Contacts section for details       Thank you  Jean Claude Song DPM [phone] Fax: [phone]

## 2021-03-03 NOTE — LETTER
Diabetic Eye Exam Form    Date Requested: 21  Patient: Efraín Yoo  Patient : 1950   Referring Provider: Keanu Garcia    Dilated Retinal Exam, Optomap-Iris Exam, or Fundus Photography Done         Yes ([place] one above)         No     Date of Diabetic Eye Exam ______________________________  Left Eye      Exam did show retinopathy    Exam did not show retinopathy         Mild       Moderate       None       Proliferative       Severe     Right Eye     Exam did show retinopathy    Exam did not show retinopathy         Mild       Moderate       None       Proliferative       Severe     Comments __________________________________________________________    Practice Providing Exam ______________________________________________    Exam Performed By (print name) _______________________________________      Provider Signature ___________________________________________________      These reports are needed for  compliance    Please fax this completed form and a copy of the Diabetic Eye Exam report to our office located at [place] [address] as soon as possible to [phone] kenny Parks: Phone [phone]    We thank you for your assistance in treating our mutual patient

## 2021-03-03 NOTE — TELEPHONE ENCOUNTER
----- Message from Jean Claude Andujar sent at 3/2/2021  3:46 PM EST -----  Regarding: diabetic foot exam  03/02/21 3:47 PM    Hello, our patient Zaida Gentile has had a diabetic foot exam completed/performed  Please assist in updating the patient chart by JERSON Venegas  The date of service is 2020      Thank you,  Sonali Taylor MA  [facility] [place]

## 2021-03-03 NOTE — TELEPHONE ENCOUNTER
Upon review of the In Basket request and the patient's chart, initial outreach has been made via fax, please see Contacts section for details       Thank you  Mirian Altamirano 6802 [phone] Fax [phone]

## 2021-03-03 NOTE — LETTER
Diabetic Foot Exam Form    Date Requested: 21  Patient: Emilia Clemons  Patient : 1950   Referring Provider: Wendy Tatum    Diabetic Foot Exam Performed with shoes and socks removed        Yes         No     Date of Diabetic Foot Exam ______________________________  Risk Score ____________________________________________    Left Foot       Visual Inspection         Monofilament Testing Sensory Exam        Pedal Pulses         Additional Comments         Right Foot      Visual Inspection         Monofilament Testing Sensory Exam       Pedal Pulses         Additional Comments         Comments __________________________________________________________    Practice Providing Exam ______________________________________________    Exam Performed By (print name) _______________________________________      Provider Signature ___________________________________________________      These reports are needed for  compliance    Please fax this completed form and a copy of the Diabetic Foot Exam report to our office located at [place] [address] as soon as possible to [phone] kenny Johnk: Phone [phone]    We thank you for your assistance in treating our mutual patient

## 2021-03-03 NOTE — TELEPHONE ENCOUNTER
----- Message from Preeti Schneider, [address] [place] sent at 3/2/2021  3:48 PM EST -----  Regarding: diabetic eye exam  03/02/21 3:49 PM    Hello, our patient Deborah Hunt has had a diabetic eye exam completed/performed  Please assist in updating the patient chart by JERSON Tom  The date of service is 2020      Thank you,  Preeti Schneider MA  [facility] [place] Coffman

## 2021-03-10 NOTE — TELEPHONE ENCOUNTER
Upon review of the In Basket request we were able to locate, review, and update the patient chart as requested for Diabetic Eye Exam and Diabetic Foot Exam     Any additional questions or concerns should be emailed to the Practice Liaisons via [email or web address] email, please do not reply via In Basket      Thank you  Jean Claude Covarrubias

## 2021-04-15 ENCOUNTER — IMMUNIZATIONS (OUTPATIENT)
Dept: FAMILY MEDICINE CLINIC | Facility: HOSPITAL | Age: 71
End: 2021-04-15

## 2021-04-15 DIAGNOSIS — Z23 ENCOUNTER FOR IMMUNIZATION: Primary | ICD-10-CM

## 2021-04-15 PROCEDURE — 91300 SARS-COV-2 / COVID-19 MRNA VACCINE (PFIZER-BIONTECH) 30 MCG: CPT

## 2021-04-15 PROCEDURE — 0001A SARS-COV-2 / COVID-19 MRNA VACCINE (PFIZER-BIONTECH) 30 MCG: CPT

## 2021-05-10 ENCOUNTER — IMMUNIZATIONS (OUTPATIENT)
Dept: FAMILY MEDICINE CLINIC | Facility: HOSPITAL | Age: 71
End: 2021-05-10

## 2021-05-10 DIAGNOSIS — Z23 ENCOUNTER FOR IMMUNIZATION: Primary | ICD-10-CM

## 2021-05-10 PROCEDURE — 91300 SARS-COV-2 / COVID-19 MRNA VACCINE (PFIZER-BIONTECH) 30 MCG: CPT

## 2021-05-10 PROCEDURE — 0002A SARS-COV-2 / COVID-19 MRNA VACCINE (PFIZER-BIONTECH) 30 MCG: CPT

## 2021-05-18 DIAGNOSIS — E11.42 TYPE 2 DIABETES MELLITUS WITH DIABETIC POLYNEUROPATHY, WITHOUT LONG-TERM CURRENT USE OF INSULIN (HCC): ICD-10-CM

## 2021-06-01 DIAGNOSIS — E11.8 TYPE 2 DIABETES MELLITUS WITH COMPLICATION (HCC): Primary | ICD-10-CM

## 2021-06-01 RX ORDER — PIOGLITAZONEHYDROCHLORIDE 45 MG/1
45 TABLET ORAL DAILY
Qty: 90 TABLET | Refills: 1 | Status: SHIPPED | OUTPATIENT
Start: 2021-06-01 | End: 2021-12-09 | Stop reason: SDUPTHER

## 2021-06-11 LAB
ALBUMIN SERPL-MCNC: 4.3 G/DL (ref 3.8–4.8)
ALBUMIN/GLOB SERPL: 1.7 {RATIO} (ref 1.2–2.2)
ALP SERPL-CCNC: 124 IU/L (ref 48–121)
ALT SERPL-CCNC: 19 IU/L (ref 0–44)
AST SERPL-CCNC: 20 IU/L (ref 0–40)
BILIRUB SERPL-MCNC: <0.2 MG/DL (ref 0–1.2)
BUN SERPL-MCNC: 12 MG/DL (ref 8–27)
BUN/CREAT SERPL: 12 (ref 10–24)
CALCIUM SERPL-MCNC: 9.4 MG/DL (ref 8.6–10.2)
CHLORIDE SERPL-SCNC: 102 MMOL/L (ref 96–106)
CHOLEST SERPL-MCNC: 136 MG/DL (ref 100–199)
CHOLEST/HDLC SERPL: 2.8 RATIO (ref 0–5)
CO2 SERPL-SCNC: 26 MMOL/L (ref 20–29)
CREAT SERPL-MCNC: 1.02 MG/DL (ref 0.76–1.27)
EST. AVERAGE GLUCOSE BLD GHB EST-MCNC: 151 MG/DL
GLOBULIN SER-MCNC: 2.5 G/DL (ref 1.5–4.5)
GLUCOSE SERPL-MCNC: 135 MG/DL (ref 65–99)
HBA1C MFR BLD: 6.9 % (ref 4.8–5.6)
HDLC SERPL-MCNC: 48 MG/DL
LDLC SERPL CALC-MCNC: 66 MG/DL (ref 0–99)
POTASSIUM SERPL-SCNC: 4.6 MMOL/L (ref 3.5–5.2)
PROT SERPL-MCNC: 6.8 G/DL (ref 6–8.5)
SL AMB EGFR AFRICAN AMERICAN: 86 ML/MIN/1.73
SL AMB EGFR NON AFRICAN AMERICAN: 75 ML/MIN/1.73
SL AMB VLDL CHOLESTEROL CALC: 22 MG/DL (ref 5–40)
SODIUM SERPL-SCNC: 140 MMOL/L (ref 134–144)
TRIGL SERPL-MCNC: 121 MG/DL (ref 0–149)
TSH SERPL DL<=0.005 MIU/L-ACNC: 2.6 UIU/ML (ref 0.45–4.5)

## 2021-06-17 ENCOUNTER — OFFICE VISIT (OUTPATIENT)
Dept: ENDOCRINOLOGY | Facility: HOSPITAL | Age: 71
End: 2021-06-17
Payer: MEDICARE

## 2021-06-17 VITALS
BODY MASS INDEX: 37.88 KG/M2 | HEIGHT: 70 IN | HEART RATE: 71 BPM | DIASTOLIC BLOOD PRESSURE: 68 MMHG | SYSTOLIC BLOOD PRESSURE: 130 MMHG | WEIGHT: 264.6 LBS

## 2021-06-17 DIAGNOSIS — E11.42 TYPE 2 DIABETES MELLITUS WITH DIABETIC POLYNEUROPATHY, WITHOUT LONG-TERM CURRENT USE OF INSULIN (HCC): Primary | ICD-10-CM

## 2021-06-17 PROCEDURE — 99214 OFFICE O/P EST MOD 30 MIN: CPT | Performed by: PHYSICIAN ASSISTANT

## 2021-06-17 PROCEDURE — 1124F ACP DISCUSS-NO DSCNMKR DOCD: CPT | Performed by: PHYSICIAN ASSISTANT

## 2021-06-17 RX ORDER — MULTIVITAMIN
1 CAPSULE ORAL DAILY
COMMUNITY

## 2021-06-17 RX ORDER — FAMOTIDINE 20 MG/1
TABLET, FILM COATED ORAL 2 TIMES DAILY
COMMUNITY
Start: 2021-03-18

## 2021-06-17 NOTE — PROGRESS NOTES
Bradley Room 73 y o  male MRN: [id number]    Encounter: [id number]      Assessment/Plan     Assessment: This is a 73y o -year-old male with  Type 2 diabetes with hyperlipidemia  Plan:  1  Type 2 diabetes:    Most recent hemoglobin A1c has decreased to 6 9  Did discussed concerns with possible hypoglycemia, but he does not make any changes do his medications  At this time will continue with Actos 45 mg daily, metformin 1000 mg twice a day, glimepiride 4 mg twice a day  Continue to monitor diet  Make sure to continue checking blood sugar 3 to 4 times a day  Continue following up with Podiatry for diabetic foot care  Follow-up in this office in 3 months with lab work completed prior to visit      2  Hyperlipidemia:  Lipid panel was normal   Continue with Crestor at this time  CC:  Type 2 diabetes follow-up    History of Present Illness     HPI:  79 year old male with type 2 diabetes for about 20 years   He is on oral agents at home and takes Actos 45 mg daily, Metformin 1000 twice daily, Glimepiride 4 mg twice a day   His most recent hemoglobin A1c from Mansi 10, 2021 was 6 9   He denies any episodes of hypoglycemia, polyuria, polydipsia, nocturia and blurry vision   He denies nephropathy and retinopathy but does admit to neuropathy    He has a history of a diabetic foot ulcer and follows Dr Becca Strong for regular diabetic foot care   Has recovered from his episode of sepsis and complications of diabetic foot ulcer at the end of November beginning of December 2020  Continues to follow up with Podiatry  He denies retinopathy or blurry vision but states that he is due for a diabetic eye exam   His most recent diabetic foot exam was performed on January 25, 2021       Blood Sugar/Glucometer/Pump/CGM review:   Reviewed blood sugar logs from  May 3 through June 17, 2021 reveals fasting blood sugar between 90 and 150  Did have an episode the beginning of May in the 200s   Before lunch it can range from 70 to 120  Before dinner it can range from 70 to 120  States he typically falls asleep and does not check prior to bed  Has had a couple episodes of hypoglycemia  Typically will eat or drink something to bring his blood sugars up  Episodes occur because he typically does not have anything for lunch, and is busy working      For his hyperlipidemia, he is treated with Crestor 5 mg daily  Review of Systems   Constitutional: Negative for activity change, appetite change, fatigue and unexpected weight change  HENT: Negative for trouble swallowing  Eyes: Negative for visual disturbance  Respiratory: Negative for chest tightness and shortness of breath  Cardiovascular: Negative for chest pain, palpitations and leg swelling  Gastrointestinal: Negative for abdominal pain, diarrhea, nausea and vomiting  Endocrine: Negative for cold intolerance, heat intolerance, polydipsia, polyphagia and polyuria  Genitourinary: Negative for frequency  Musculoskeletal: Positive for arthralgias and gait problem (  Uses a motorized scooter)  Skin: Negative for rash and wound  Neurological: Positive for numbness  Negative for dizziness, weakness, light-headedness and headaches  Psychiatric/Behavioral: Negative for dysphoric mood and sleep disturbance  The patient is not nervous/anxious          Historical Information   Past Medical History:   Diagnosis Date    Charcot ankle, left     Diabetes mellitus ([facility] [address] )     Diabetic neuropathy (HCC)     GERD (gastroesophageal reflux disease) 12/28/2020    Hyperlipidemia     Iron deficiency anemia 12/28/2020    Personal history of colonic polyps 12/28/2020    Last colonoscopy 2016     Past Surgical History:   Procedure Laterality Date    ANKLE SURGERY Left     CARPAL TUNNEL RELEASE      REPLACEMENT TOTAL KNEE Left      Social History   Social History     Substance and Sexual Activity   Alcohol Use Yes     Social History     Substance and Sexual Activity   Drug Use Never Social History     Tobacco Use   Smoking Status Former Smoker    Packs/day: 0 75    Years: 15 00    Pack years: 11 25    Types: Cigarettes    Start date: 8/14/1983   Smokeless Tobacco Never Used     Family History:   Family History   Problem Relation Age of Onset    Hypertension Mother     No Known Problems Father     Diabetes type II Sister     No Known Problems Brother     Diabetes type II Sister     Colon cancer Neg Hx     Colon polyps Neg Hx        Meds/Allergies   Current Outpatient Medications   Medication Sig Dispense Refill    aspirin (ECOTRIN LOW STRENGTH) 81 mg EC tablet Take 81 mg by mouth daily      famotidine (PEPCID) 20 mg tablet 2 (two) times a day       Ferrous Sulfate (Iron) 325 (65 Fe) MG TABS Take by mouth      gabapentin (NEURONTIN) 100 mg capsule Take 500 mg by mouth 3 (three) times a day       glimepiride (AMARYL) 4 mg tablet Take 1 tablet (4 mg total) by mouth 2 (two) times a day Take 1 tablet by mouth once daily (Patient taking differently: Take 4 mg by mouth 2 (two) times a day ) 180 tablet 1    glucose blood (EVERETT CONTOUR TEST) test strip every 24 hours      metFORMIN (GLUCOPHAGE) 1000 MG tablet Take 1 tablet (1,000 mg total) by mouth 2 (two) times a day with meals 180 tablet 1    Multiple Vitamin (multivitamin) capsule Take 1 capsule by mouth daily      pioglitazone (ACTOS) 45 mg tablet Take 1 tablet (45 mg total) by mouth daily 90 tablet 1    pregabalin (LYRICA) 25 mg capsule Take 50 mg by mouth 3 (three) times a day      rosuvastatin (CRESTOR) 5 mg tablet Take 5 mg by mouth daily      traMADol (ULTRAM) 50 mg tablet Take 50 mg by mouth every 6 (six) hours      furosemide (LASIX) 20 mg tablet Take 20 mg by mouth daily (Patient not taking: Reported on 6/17/2021)       No current facility-administered medications for this visit       Allergies   Allergen Reactions    Penicillin G      Other reaction(s): Unknown       Objective   Vitals: Blood pressure 130/68, pulse 71, height 5' 10" (1 778 m), weight 120 kg (264 lb 9 6 oz)  Physical Exam  Vitals and nursing note reviewed  Constitutional:       General: He is not in acute distress  Appearance: Normal appearance  HENT:      Head: Normocephalic and atraumatic  Eyes:      General: No scleral icterus  Extraocular Movements: Extraocular movements intact  Conjunctiva/sclera: Conjunctivae normal       Pupils: Pupils are equal, round, and reactive to light  Cardiovascular:      Rate and Rhythm: Normal rate and regular rhythm  Heart sounds: No murmur heard  Pulmonary:      Effort: Pulmonary effort is normal  No respiratory distress  Breath sounds: Normal breath sounds  No wheezing  Musculoskeletal:      Cervical back: Normal range of motion  Right lower leg: No edema  Left lower leg: No edema  Lymphadenopathy:      Cervical: No cervical adenopathy  Skin:     General: Skin is warm and dry  Neurological:      Mental Status: He is alert and oriented to person, place, and time  Mental status is at baseline  Sensory: No sensory deficit  Psychiatric:         Mood and Affect: Mood normal          Behavior: Behavior normal          Thought Content: Thought content normal          The history was obtained from the review of the chart, patient      Lab Results:   Lab Results   Component Value Date/Time    Hemoglobin A1C 6 9 (H) 06/10/2021 12:41 PM    Hemoglobin A1C 7 0 (H) 02/22/2021 09:16 AM    Hemoglobin A1C 8 0 (H) 10/30/2020 10:05 AM    White Blood Cell Count 5 2 01/26/2021 01:35 PM    Hemoglobin 10 7 (L) 01/26/2021 01:35 PM    HCT 33 9 (L) 01/26/2021 01:35 PM    MCV 80 01/26/2021 01:35 PM    Platelet Count 633 54/23/7171 01:35 PM    BUN 12 06/10/2021 12:41 PM    BUN 19 02/22/2021 09:16 AM    BUN 12 10/30/2020 10:05 AM    Potassium 4 6 06/10/2021 12:41 PM    Potassium 4 7 02/22/2021 09:16 AM    Potassium 4 7 10/30/2020 10:05 AM    Chloride 102 06/10/2021 12:41 PM    Chloride 104 02/22/2021 09:16 AM    Chloride 96 10/30/2020 10:05 AM    CO2 26 06/10/2021 12:41 PM    CO2 26 02/22/2021 09:16 AM    CO2 29 10/30/2020 10:05 AM    Creatinine 1 02 06/10/2021 12:41 PM    Creatinine 1 10 02/22/2021 09:16 AM    Creatinine 0 76 10/30/2020 10:05 AM    AST 20 06/10/2021 12:41 PM    AST 19 02/22/2021 09:16 AM    AST 14 10/30/2020 10:05 AM    ALT 19 06/10/2021 12:41 PM    ALT 15 02/22/2021 09:16 AM    ALT 11 10/30/2020 10:05 AM    Albumin 4 3 06/10/2021 12:41 PM    Albumin 4 3 02/22/2021 09:16 AM    Albumin 3 0 (L) 10/30/2020 10:05 AM    Globulin, Total 2 5 06/10/2021 12:41 PM    Globulin, Total 2 7 02/22/2021 09:16 AM    Globulin, Total 3 2 10/30/2020 10:05 AM    HDL 48 06/10/2021 12:41 PM    HDL 34 (L) 10/30/2020 10:05 AM    Triglycerides 121 06/10/2021 12:41 PM    Triglycerides 55 10/30/2020 10:05 AM         Portions of the record may have been created with voice recognition software  Occasional wrong word or "sound a like" substitutions may have occurred due to the inherent limitations of voice recognition software  Read the chart carefully and recognize, using context, where substitutions have occurred

## 2021-06-17 NOTE — PATIENT INSTRUCTIONS
Continue monitor diet, and maintain physical activity   Make sure to drink plenty water throughout the day      Check your blood sugars regularly      Continue Actos, glimepiride, and Metformin at current dose      Please continue to check your blood sugars at least twice daily at alternating times and send a record to the office in 2 weeks for review      Continue to follow up with Dr Eris Wilkins for Podiatry      Follow up with Ophthalmology for a diabetic eye exam      Continue Crestor      Consider follow up with medical nutrition therapy for help with your diet

## 2021-07-02 DIAGNOSIS — E11.42 TYPE 2 DIABETES MELLITUS WITH DIABETIC POLYNEUROPATHY, WITHOUT LONG-TERM CURRENT USE OF INSULIN (HCC): ICD-10-CM

## 2021-07-06 RX ORDER — GLIMEPIRIDE 4 MG/1
TABLET ORAL
Qty: 180 TABLET | Refills: 1 | Status: SHIPPED | OUTPATIENT
Start: 2021-07-06

## 2021-09-15 LAB
ALBUMIN SERPL-MCNC: 4.1 G/DL (ref 3.8–4.8)
ALBUMIN/GLOB SERPL: 1.5 {RATIO} (ref 1.2–2.2)
ALP SERPL-CCNC: 176 IU/L (ref 44–121)
ALT SERPL-CCNC: 15 IU/L (ref 0–44)
AST SERPL-CCNC: 21 IU/L (ref 0–40)
BILIRUB SERPL-MCNC: 0.3 MG/DL (ref 0–1.2)
BUN SERPL-MCNC: 18 MG/DL (ref 8–27)
BUN/CREAT SERPL: 18 (ref 10–24)
CALCIUM SERPL-MCNC: 9.7 MG/DL (ref 8.6–10.2)
CHLORIDE SERPL-SCNC: 102 MMOL/L (ref 96–106)
CO2 SERPL-SCNC: 25 MMOL/L (ref 20–29)
CREAT SERPL-MCNC: 0.99 MG/DL (ref 0.76–1.27)
EST. AVERAGE GLUCOSE BLD GHB EST-MCNC: 154 MG/DL
GLOBULIN SER-MCNC: 2.7 G/DL (ref 1.5–4.5)
GLUCOSE SERPL-MCNC: 171 MG/DL (ref 65–99)
HBA1C MFR BLD: 7 % (ref 4.8–5.6)
POTASSIUM SERPL-SCNC: 4.9 MMOL/L (ref 3.5–5.2)
PROT SERPL-MCNC: 6.8 G/DL (ref 6–8.5)
SL AMB EGFR AFRICAN AMERICAN: 89 ML/MIN/1.73
SL AMB EGFR NON AFRICAN AMERICAN: 77 ML/MIN/1.73
SODIUM SERPL-SCNC: 140 MMOL/L (ref 134–144)

## 2021-09-21 ENCOUNTER — OFFICE VISIT (OUTPATIENT)
Dept: ENDOCRINOLOGY | Facility: HOSPITAL | Age: 71
End: 2021-09-21
Payer: MEDICARE

## 2021-09-21 VITALS
SYSTOLIC BLOOD PRESSURE: 146 MMHG | HEIGHT: 70 IN | WEIGHT: 259 LBS | HEART RATE: 76 BPM | DIASTOLIC BLOOD PRESSURE: 78 MMHG | BODY MASS INDEX: 37.08 KG/M2

## 2021-09-21 DIAGNOSIS — E11.42 TYPE 2 DIABETES MELLITUS WITH DIABETIC POLYNEUROPATHY, WITHOUT LONG-TERM CURRENT USE OF INSULIN (HCC): Primary | ICD-10-CM

## 2021-09-21 PROCEDURE — 99214 OFFICE O/P EST MOD 30 MIN: CPT | Performed by: PHYSICIAN ASSISTANT

## 2021-09-21 NOTE — PATIENT INSTRUCTIONS
Continue monitor diet, and maintain physical activity   Make sure to drink plenty water throughout the day      Check your blood sugars regularly      Continue Actos, glimepiride, and Metformin at current dose      Please continue to check your blood sugars at least twice daily at alternating times and send a record to the office in 2 weeks for review      Continue to follow up with Dr Sourav Rice for Podiatry      Follow up with Ophthalmology for a diabetic eye exam      Continue Crestor      Consider follow up with medical nutrition therapy for help with your diet

## 2021-09-21 NOTE — PROGRESS NOTES
Jazzy Martinez 73 y o  male MRN: [id number]    Encounter: [id number]      Assessment/Plan     Assessment: This is a 73y o -year-old male with type 2 diabetes with hyperlipidemia  Plan:  1  Type 2 diabetes:    most recent hemoglobin A1c remains stable at 7 0  No adjustments we made to his medication at this time  He will continue with Actos 45 mg daily, glimepiride 4 mg twice a day, metformin 1000 mg twice a day  He will continue checking blood sugar 2 times a day at alternating times  Asked him to send in blood sugar logs in 2 weeks for review  Contact us if there is any concerns for consistent hypoglycemia  Follow-up in 3 months with lab work completed prior to visit      2  Hyperlipidemia:  Lipid panel was normal   Continue with Crestor at this time  Recheck prior to next office visit  CC:  Type 2 diabetes follow-up    History of Present Illness     HPI:  79 year old male with type 2 diabetes for about 20 years   He is on oral agents at home and takes Actos 45 mg daily, Metformin 1000 twice daily, Glimepiride 4 mg twice a day   His most recent hemoglobin A1c from September 14, 2021 was 7 0   He denies any episodes of hypoglycemia, polyuria, polydipsia, nocturia and blurry vision   He denies nephropathy and retinopathy but does admit to neuropathy    He has a history of a diabetic foot ulcer and follows Dr Bre Burroughs for regular diabetic foot care   Has recovered from his episode of sepsis and complications of diabetic foot ulcer at the end of November beginning of December 2020   Continues to follow up with Podiatry  He denies retinopathy or blurry vision but states that he is due for a diabetic eye exam   His most recent diabetic foot exam was performed on January 25, 2021  Recently had a right knee replacement and has been doing well      Blood Sugar/Glucometer/Pump/CGM review:     Reviewed blood sugar logs from July 5 through September 21, 2021   Fasting blood sugar is typically between 110 and 160  Prior to lunch it is typically between 90 and 150  Prior to dinner there is a lot of variability any can range from 70 to 200  Prior to bed typically between 110 and 160      For his hyperlipidemia, he is treated with Crestor 5 mg daily  Review of Systems   Constitutional: Negative for activity change, appetite change, fatigue and unexpected weight change  HENT: Negative for trouble swallowing  Eyes: Negative for visual disturbance  Respiratory: Negative for chest tightness and shortness of breath  Cardiovascular: Negative for chest pain, palpitations and leg swelling  Gastrointestinal: Negative for abdominal pain, diarrhea, nausea and vomiting  Endocrine: Negative for cold intolerance, heat intolerance, polydipsia, polyphagia and polyuria  Genitourinary: Negative for frequency  Musculoskeletal: Positive for arthralgias and gait problem (Utilizes cane)  Skin: Negative for rash and wound  Neurological: Positive for numbness  Negative for dizziness, weakness, light-headedness and headaches  Psychiatric/Behavioral: Negative for dysphoric mood and sleep disturbance  The patient is not nervous/anxious          Historical Information   Past Medical History:   Diagnosis Date    Charcot ankle, left     Diabetes mellitus ([address] )     Diabetic neuropathy (HCC)     GERD (gastroesophageal reflux disease) 12/28/2020    Hyperlipidemia     Iron deficiency anemia 12/28/2020    Personal history of colonic polyps 12/28/2020    Last colonoscopy 2016     Past Surgical History:   Procedure Laterality Date    ANKLE SURGERY Left     CARPAL TUNNEL RELEASE      REPLACEMENT TOTAL KNEE Left      Social History   Social History     Substance and Sexual Activity   Alcohol Use Yes     Social History     Substance and Sexual Activity   Drug Use Never     Social History     Tobacco Use   Smoking Status Former Smoker    Packs/day: 0 75    Years: 15 00    Pack years: 11 25    Types: Cigarettes    Start date: 8/14/1983   Smokeless Tobacco Never Used     Family History:   Family History   Problem Relation Age of Onset    Hypertension Mother     No Known Problems Father     Diabetes type II Sister     No Known Problems Brother     Diabetes type II Sister     Colon cancer Neg Hx     Colon polyps Neg Hx        Meds/Allergies   Current Outpatient Medications   Medication Sig Dispense Refill    aspirin (ECOTRIN LOW STRENGTH) 81 mg EC tablet Take 81 mg by mouth daily      famotidine (PEPCID) 20 mg tablet 2 (two) times a day       Ferrous Sulfate (Iron) 325 (65 Fe) MG TABS Take by mouth      furosemide (LASIX) 20 mg tablet Take 20 mg by mouth daily (Patient not taking: Reported on 6/17/2021)      gabapentin (NEURONTIN) 100 mg capsule Take 500 mg by mouth 3 (three) times a day       glimepiride (AMARYL) 4 mg tablet TAKE 1 TABLET BY MOUTH TWO TIMES A DAY AT BREAKFAST AND DINNER 180 tablet 1    glucose blood (EVERETT CONTOUR TEST) test strip every 24 hours      metFORMIN (GLUCOPHAGE) 1000 MG tablet Take 1 tablet (1,000 mg total) by mouth 2 (two) times a day with meals 180 tablet 1    Multiple Vitamin (multivitamin) capsule Take 1 capsule by mouth daily      pioglitazone (ACTOS) 45 mg tablet Take 1 tablet (45 mg total) by mouth daily 90 tablet 1    pregabalin (LYRICA) 25 mg capsule Take 50 mg by mouth 3 (three) times a day      rosuvastatin (CRESTOR) 5 mg tablet Take 5 mg by mouth daily      traMADol (ULTRAM) 50 mg tablet Take 50 mg by mouth every 6 (six) hours       No current facility-administered medications for this visit  Allergies   Allergen Reactions    Penicillin G      Other reaction(s): Unknown       Objective   Vitals: There were no vitals taken for this visit  Physical Exam  Vitals and nursing note reviewed  Constitutional:       General: He is not in acute distress  Appearance: Normal appearance  He is not diaphoretic  HENT:      Head: Normocephalic and atraumatic     Eyes: General: No scleral icterus  Extraocular Movements: Extraocular movements intact  Conjunctiva/sclera: Conjunctivae normal       Pupils: Pupils are equal, round, and reactive to light  Cardiovascular:      Rate and Rhythm: Normal rate and regular rhythm  Heart sounds: No murmur heard  Pulmonary:      Effort: Pulmonary effort is normal  No respiratory distress  Breath sounds: Normal breath sounds  No wheezing  Musculoskeletal:      Right lower leg: No edema  Left lower leg: No edema  Lymphadenopathy:      Cervical: No cervical adenopathy  Skin:     General: Skin is warm and dry  Neurological:      Mental Status: He is alert and oriented to person, place, and time  Mental status is at baseline  Sensory: No sensory deficit  Psychiatric:         Mood and Affect: Mood normal          Behavior: Behavior normal          Thought Content: Thought content normal          The history was obtained from the review of the chart, patient      Lab Results:   Lab Results   Component Value Date/Time    Hemoglobin A1C 7 0 (H) 09/14/2021 10:50 AM    Hemoglobin A1C 6 9 (H) 06/10/2021 12:41 PM    Hemoglobin A1C 7 0 (H) 02/22/2021 09:16 AM    White Blood Cell Count 5 2 01/26/2021 01:35 PM    Hemoglobin 10 7 (L) 01/26/2021 01:35 PM    HCT 33 9 (L) 01/26/2021 01:35 PM    MCV 80 01/26/2021 01:35 PM    Platelet Count 327 20/49/6266 01:35 PM    BUN 18 09/14/2021 10:50 AM    BUN 12 06/10/2021 12:41 PM    BUN 19 02/22/2021 09:16 AM    Potassium 4 9 09/14/2021 10:50 AM    Potassium 4 6 06/10/2021 12:41 PM    Potassium 4 7 02/22/2021 09:16 AM    Chloride 102 09/14/2021 10:50 AM    Chloride 102 06/10/2021 12:41 PM    Chloride 104 02/22/2021 09:16 AM    CO2 25 09/14/2021 10:50 AM    CO2 26 06/10/2021 12:41 PM    CO2 26 02/22/2021 09:16 AM    Creatinine 0 99 09/14/2021 10:50 AM    Creatinine 1 02 06/10/2021 12:41 PM    Creatinine 1 10 02/22/2021 09:16 AM    AST 21 09/14/2021 10:50 AM    AST 20 06/10/2021 12:41 PM    AST 19 02/22/2021 09:16 AM    ALT 15 09/14/2021 10:50 AM    ALT 19 06/10/2021 12:41 PM    ALT 15 02/22/2021 09:16 AM    Albumin 4 1 09/14/2021 10:50 AM    Albumin 4 3 06/10/2021 12:41 PM    Albumin 4 3 02/22/2021 09:16 AM    Globulin, Total 2 7 09/14/2021 10:50 AM    Globulin, Total 2 5 06/10/2021 12:41 PM    Globulin, Total 2 7 02/22/2021 09:16 AM    HDL 48 06/10/2021 12:41 PM    HDL 34 (L) 10/30/2020 10:05 AM    Triglycerides 121 06/10/2021 12:41 PM    Triglycerides 55 10/30/2020 10:05 AM       Portions of the record may have been created with voice recognition software  Occasional wrong word or "sound a like" substitutions may have occurred due to the inherent limitations of voice recognition software  Read the chart carefully and recognize, using context, where substitutions have occurred

## 2021-12-09 DIAGNOSIS — E11.8 TYPE 2 DIABETES MELLITUS WITH COMPLICATION (HCC): ICD-10-CM

## 2021-12-09 RX ORDER — PIOGLITAZONEHYDROCHLORIDE 45 MG/1
45 TABLET ORAL DAILY
Qty: 90 TABLET | Refills: 1 | Status: SHIPPED | OUTPATIENT
Start: 2021-12-09 | End: 2022-07-06 | Stop reason: SDUPTHER

## 2021-12-15 LAB
ALBUMIN SERPL-MCNC: 3.9 G/DL (ref 3.8–4.8)
ALBUMIN/GLOB SERPL: 1.7 {RATIO} (ref 1.2–2.2)
ALP SERPL-CCNC: 134 IU/L (ref 44–121)
ALT SERPL-CCNC: 22 IU/L (ref 0–44)
AST SERPL-CCNC: 29 IU/L (ref 0–40)
BILIRUB SERPL-MCNC: 0.3 MG/DL (ref 0–1.2)
BUN SERPL-MCNC: 16 MG/DL (ref 8–27)
BUN/CREAT SERPL: 17 (ref 10–24)
CALCIUM SERPL-MCNC: 8.9 MG/DL (ref 8.6–10.2)
CHLORIDE SERPL-SCNC: 103 MMOL/L (ref 96–106)
CHOLEST SERPL-MCNC: 94 MG/DL (ref 100–199)
CHOLEST/HDLC SERPL: 2.2 RATIO (ref 0–5)
CO2 SERPL-SCNC: 26 MMOL/L (ref 20–29)
CREAT SERPL-MCNC: 0.93 MG/DL (ref 0.76–1.27)
EST. AVERAGE GLUCOSE BLD GHB EST-MCNC: 160 MG/DL
GLOBULIN SER-MCNC: 2.3 G/DL (ref 1.5–4.5)
GLUCOSE SERPL-MCNC: 180 MG/DL (ref 65–99)
HBA1C MFR BLD: 7.2 % (ref 4.8–5.6)
HDLC SERPL-MCNC: 43 MG/DL
LDLC SERPL CALC-MCNC: 38 MG/DL (ref 0–99)
POTASSIUM SERPL-SCNC: 4.5 MMOL/L (ref 3.5–5.2)
PROT SERPL-MCNC: 6.2 G/DL (ref 6–8.5)
SL AMB EGFR AFRICAN AMERICAN: 96 ML/MIN/1.73
SL AMB EGFR NON AFRICAN AMERICAN: 83 ML/MIN/1.73
SL AMB VLDL CHOLESTEROL CALC: 13 MG/DL (ref 5–40)
SODIUM SERPL-SCNC: 139 MMOL/L (ref 134–144)
TRIGL SERPL-MCNC: 52 MG/DL (ref 0–149)
TSH SERPL DL<=0.005 MIU/L-ACNC: 2.64 UIU/ML (ref 0.45–4.5)

## 2022-01-10 ENCOUNTER — TELEPHONE (OUTPATIENT)
Dept: ENDOCRINOLOGY | Facility: HOSPITAL | Age: 72
End: 2022-01-10

## 2022-01-10 NOTE — TELEPHONE ENCOUNTER
Scheduled pt for 1/24/22 appt  He needs renewal of metformin/ 1000mg/ 2x daily/ 90 day supply/ send to [facility]

## 2022-01-24 ENCOUNTER — OFFICE VISIT (OUTPATIENT)
Dept: ENDOCRINOLOGY | Facility: HOSPITAL | Age: 72
End: 2022-01-24
Payer: MEDICARE

## 2022-01-24 VITALS
HEART RATE: 82 BPM | SYSTOLIC BLOOD PRESSURE: 132 MMHG | BODY MASS INDEX: 37.99 KG/M2 | WEIGHT: 265.4 LBS | HEIGHT: 70 IN | DIASTOLIC BLOOD PRESSURE: 68 MMHG

## 2022-01-24 DIAGNOSIS — E11.42 TYPE 2 DIABETES MELLITUS WITH DIABETIC POLYNEUROPATHY, WITHOUT LONG-TERM CURRENT USE OF INSULIN (HCC): Primary | ICD-10-CM

## 2022-01-24 DIAGNOSIS — E78.5 HYPERLIPIDEMIA, UNSPECIFIED HYPERLIPIDEMIA TYPE: ICD-10-CM

## 2022-01-24 PROCEDURE — 99214 OFFICE O/P EST MOD 30 MIN: CPT | Performed by: PHYSICIAN ASSISTANT

## 2022-01-24 NOTE — PROGRESS NOTES
Capri Perez 73 y o  male MRN: [id number]    Encounter: [id number]      Assessment/Plan     Assessment: This is a 73y o -year-old male with type 2 diabetes with hyperlipidemia  Plan:  1  Type 2 diabetes:    most recent hemoglobin A1c increased slightly to 7 2  No adjustments we made to his medication at this time  He will continue with Actos 45 mg daily, glimepiride 4 mg twice a day, metformin 1000 mg twice a day  Did discussed possibly starting a GLP 1 agonist to help with weight loss as he will likely need an amputation of his left foot, and was recommended by podiatrist to lose weight before the surgery  He will think about it at this time  He will continue checking blood sugar 2 times a day at alternating times  Asked him to send in blood sugar logs in 2 weeks for review  Encourage lifestyle modifications to help improve glucose levels  Follow-up in 3 months with lab work completed prior to visit      2  Hyperlipidemia:  Lipid panel was normal   Continue with Crestor at this time  Recheck prior to next office visit      CC:  Type 2 diabetes follow-up    History of Present Illness     HPI:  79 year old male with type 2 diabetes for about 20 years   He is on oral agents at home and takes Actos 45 mg daily, Metformin 1000 twice daily, Glimepiride 4 mg twice a day   His most recent hemoglobin A1c from December 14, 2021 was 7 2   He denies any episodes of hypoglycemia, polyuria, polydipsia, nocturia and blurry vision   He denies nephropathy and retinopathy but does admit to neuropathy    He has a history of a diabetic foot ulcer and follows Dr Arie Villeda for regular diabetic foot care   Has recovered from his episode of sepsis and complications of diabetic foot ulcer at the end of November beginning of December 2020   Continues to follow up with Podiatry  He denies retinopathy or blurry vision but states that he is due for a diabetic eye exam   His most recent diabetic foot exam was performed on January 2021  Currently left foot is in a boot  Was recommended by podiatrist that he will likely need an amputation  Has been under lot of stress recently as his wife is currently on dialysis  Does admit to dietary indiscretion due to the holidays, and also increased stress in his life right now  Has also gained some weight recently      Blood Sugar/Glucometer/Pump/CGM review:  Has not been checking blood sugars recently      For his hyperlipidemia, he is treated with Crestor 5 mg daily  Review of Systems   Constitutional: Negative for activity change, appetite change, fatigue and unexpected weight change  HENT: Negative for trouble swallowing  Eyes: Negative for visual disturbance  Respiratory: Negative for chest tightness and shortness of breath  Cardiovascular: Negative for chest pain, palpitations and leg swelling  Gastrointestinal: Negative for abdominal pain, diarrhea, nausea and vomiting  Endocrine: Negative for cold intolerance, heat intolerance, polydipsia, polyphagia and polyuria  Genitourinary: Negative for frequency  Musculoskeletal: Positive for arthralgias and gait problem (Utilizes cane)  Skin: Negative for rash and wound  Neurological: Positive for numbness  Negative for dizziness, weakness, light-headedness and headaches  Psychiatric/Behavioral: Negative for dysphoric mood and sleep disturbance  The patient is not nervous/anxious          Historical Information   Past Medical History:   Diagnosis Date    Charcot ankle, left     Diabetes mellitus ([address] )     Diabetic neuropathy (HCC)     GERD (gastroesophageal reflux disease) 12/28/2020    Hyperlipidemia     Iron deficiency anemia 12/28/2020    Personal history of colonic polyps 12/28/2020    Last colonoscopy 2016     Past Surgical History:   Procedure Laterality Date    ANKLE SURGERY Left     CARPAL TUNNEL RELEASE      REPLACEMENT TOTAL KNEE Left      Social History   Social History     Substance and Sexual Activity   Alcohol Use Yes     Social History     Substance and Sexual Activity   Drug Use Never     Social History     Tobacco Use   Smoking Status Former Smoker    Packs/day: 0 75    Years: 15 00    Pack years: 11 25    Types: Cigarettes    Start date: 8/14/1983   Smokeless Tobacco Never Used     Family History:   Family History   Problem Relation Age of Onset    Hypertension Mother     No Known Problems Father     Diabetes type II Sister     No Known Problems Brother     Diabetes type II Sister     Colon cancer Neg Hx     Colon polyps Neg Hx        Meds/Allergies   Current Outpatient Medications   Medication Sig Dispense Refill    glucose blood (EVERETT CONTOUR TEST) test strip every 24 hours      aspirin (ECOTRIN LOW STRENGTH) 81 mg EC tablet Take 81 mg by mouth daily      famotidine (PEPCID) 20 mg tablet 2 (two) times a day       Ferrous Sulfate (Iron) 325 (65 Fe) MG TABS Take by mouth      furosemide (LASIX) 20 mg tablet Take 20 mg by mouth daily (Patient not taking: Reported on 6/17/2021)      gabapentin (NEURONTIN) 100 mg capsule Take 500 mg by mouth 3 (three) times a day       glimepiride (AMARYL) 4 mg tablet TAKE 1 TABLET BY MOUTH TWO TIMES A DAY AT BREAKFAST AND DINNER 180 tablet 1    metFORMIN (GLUCOPHAGE) 1000 MG tablet Take 1 tablet (1,000 mg total) by mouth 2 (two) times a day with meals 180 tablet 0    Multiple Vitamin (multivitamin) capsule Take 1 capsule by mouth daily      pioglitazone (ACTOS) 45 mg tablet Take 1 tablet (45 mg total) by mouth daily 90 tablet 1    pregabalin (LYRICA) 25 mg capsule Take 50 mg by mouth 3 (three) times a day      rosuvastatin (CRESTOR) 5 mg tablet Take 5 mg by mouth daily      traMADol (ULTRAM) 50 mg tablet Take 50 mg by mouth every 6 (six) hours       No current facility-administered medications for this visit       Allergies   Allergen Reactions    Penicillin G      Other reaction(s): Unknown       Objective   Vitals: Height 5' 10" (1 778 m), weight 120 kg (265 lb 6 4 oz)  Physical Exam  Vitals and nursing note reviewed  Constitutional:       General: He is not in acute distress  Appearance: Normal appearance  He is not diaphoretic  HENT:      Head: Normocephalic and atraumatic  Eyes:      General: No scleral icterus  Extraocular Movements: Extraocular movements intact  Conjunctiva/sclera: Conjunctivae normal       Pupils: Pupils are equal, round, and reactive to light  Cardiovascular:      Rate and Rhythm: Normal rate and regular rhythm  Heart sounds: No murmur heard  Pulmonary:      Effort: Pulmonary effort is normal  No respiratory distress  Breath sounds: Normal breath sounds  No wheezing  Musculoskeletal:      Right lower leg: No edema  Left lower leg: No edema  Comments: Left foot currently in a boot   Lymphadenopathy:      Cervical: No cervical adenopathy  Skin:     General: Skin is warm and dry  Neurological:      Mental Status: He is alert and oriented to person, place, and time  Mental status is at baseline  Sensory: No sensory deficit  Gait: Gait abnormal (Utilizes a cane)  Psychiatric:         Mood and Affect: Mood normal          Behavior: Behavior normal          Thought Content: Thought content normal          The history was obtained from the review of the chart, patient      Lab Results:   Lab Results   Component Value Date/Time    Hemoglobin A1C 7 2 (H) 12/14/2021 10:32 AM    Hemoglobin A1C 7 0 (H) 09/14/2021 10:50 AM    Hemoglobin A1C 6 9 (H) 06/10/2021 12:41 PM    White Blood Cell Count 5 2 01/26/2021 01:35 PM    Hemoglobin 10 7 (L) 01/26/2021 01:35 PM    HCT 33 9 (L) 01/26/2021 01:35 PM    MCV 80 01/26/2021 01:35 PM    Platelet Count 823 51/91/2776 01:35 PM    BUN 16 12/14/2021 10:32 AM    BUN 18 09/14/2021 10:50 AM    BUN 12 06/10/2021 12:41 PM    Potassium 4 5 12/14/2021 10:32 AM    Potassium 4 9 09/14/2021 10:50 AM    Potassium 4 6 06/10/2021 12:41 PM    Chloride 103 12/14/2021 10:32 AM    Chloride 102 09/14/2021 10:50 AM    Chloride 102 06/10/2021 12:41 PM    CO2 26 12/14/2021 10:32 AM    CO2 25 09/14/2021 10:50 AM    CO2 26 06/10/2021 12:41 PM    Creatinine 0 93 12/14/2021 10:32 AM    Creatinine 0 99 09/14/2021 10:50 AM    Creatinine 1 02 06/10/2021 12:41 PM    AST 29 12/14/2021 10:32 AM    AST 21 09/14/2021 10:50 AM    AST 20 06/10/2021 12:41 PM    ALT 22 12/14/2021 10:32 AM    ALT 15 09/14/2021 10:50 AM    ALT 19 06/10/2021 12:41 PM    Albumin 3 9 12/14/2021 10:32 AM    Albumin 4 1 09/14/2021 10:50 AM    Albumin 4 3 06/10/2021 12:41 PM    Globulin, Total 2 3 12/14/2021 10:32 AM    Globulin, Total 2 7 09/14/2021 10:50 AM    Globulin, Total 2 5 06/10/2021 12:41 PM    HDL 43 12/14/2021 10:32 AM    HDL 48 06/10/2021 12:41 PM    Triglycerides 52 12/14/2021 10:32 AM    Triglycerides 121 06/10/2021 12:41 PM       Portions of the record may have been created with voice recognition software  Occasional wrong word or "sound a like" substitutions may have occurred due to the inherent limitations of voice recognition software  Read the chart carefully and recognize, using context, where substitutions have occurred

## 2022-01-24 NOTE — PATIENT INSTRUCTIONS
Continue monitor diet, and maintain physical activity   Make sure to drink plenty water throughout the day      Check your blood sugars regularly      Continue Actos, glimepiride, and Metformin at current dose      Please continue to check your blood sugars at least twice daily at alternating times and send a record to the office in 2 weeks for review      Continue to follow up with Dr Wagner [facility] for Podiatry      Continue Crestor      Consider follow up with medical nutrition therapy for help with your diet

## 2022-04-21 LAB
ALBUMIN SERPL-MCNC: 4.2 G/DL (ref 3.8–4.8)
ALBUMIN/CREAT UR: 8 MG/G CREAT (ref 0–29)
ALBUMIN/GLOB SERPL: 1.8 {RATIO} (ref 1.2–2.2)
ALP SERPL-CCNC: 141 IU/L (ref 44–121)
ALT SERPL-CCNC: 21 IU/L (ref 0–44)
AST SERPL-CCNC: 23 IU/L (ref 0–40)
BILIRUB SERPL-MCNC: 0.2 MG/DL (ref 0–1.2)
BUN SERPL-MCNC: 18 MG/DL (ref 8–27)
BUN/CREAT SERPL: 20 (ref 10–24)
CALCIUM SERPL-MCNC: 9.3 MG/DL (ref 8.6–10.2)
CHLORIDE SERPL-SCNC: 106 MMOL/L (ref 96–106)
CO2 SERPL-SCNC: 23 MMOL/L (ref 20–29)
CREAT SERPL-MCNC: 0.91 MG/DL (ref 0.76–1.27)
CREAT UR-MCNC: 213.9 MG/DL
EGFR: 91 ML/MIN/1.73
EST. AVERAGE GLUCOSE BLD GHB EST-MCNC: 157 MG/DL
GLOBULIN SER-MCNC: 2.4 G/DL (ref 1.5–4.5)
GLUCOSE SERPL-MCNC: 140 MG/DL (ref 65–99)
HBA1C MFR BLD: 7.1 % (ref 4.8–5.6)
MICROALBUMIN UR-MCNC: 17.4 UG/ML
POTASSIUM SERPL-SCNC: 4.7 MMOL/L (ref 3.5–5.2)
PROT SERPL-MCNC: 6.6 G/DL (ref 6–8.5)
SODIUM SERPL-SCNC: 145 MMOL/L (ref 134–144)

## 2022-04-22 DIAGNOSIS — E11.42 TYPE 2 DIABETES MELLITUS WITH DIABETIC POLYNEUROPATHY, WITHOUT LONG-TERM CURRENT USE OF INSULIN (HCC): Primary | ICD-10-CM

## 2022-04-27 ENCOUNTER — OFFICE VISIT (OUTPATIENT)
Dept: ENDOCRINOLOGY | Facility: HOSPITAL | Age: 72
End: 2022-04-27

## 2022-04-27 ENCOUNTER — TELEPHONE (OUTPATIENT)
Dept: ADMINISTRATIVE | Facility: OTHER | Age: 72
End: 2022-04-27

## 2022-04-27 VITALS
WEIGHT: 259.6 LBS | DIASTOLIC BLOOD PRESSURE: 72 MMHG | HEART RATE: 76 BPM | HEIGHT: 70 IN | BODY MASS INDEX: 37.16 KG/M2 | SYSTOLIC BLOOD PRESSURE: 130 MMHG

## 2022-04-27 DIAGNOSIS — E78.5 HYPERLIPIDEMIA, UNSPECIFIED HYPERLIPIDEMIA TYPE: ICD-10-CM

## 2022-04-27 DIAGNOSIS — E11.42 TYPE 2 DIABETES MELLITUS WITH DIABETIC POLYNEUROPATHY, WITHOUT LONG-TERM CURRENT USE OF INSULIN (HCC): Primary | ICD-10-CM

## 2022-04-27 RX ORDER — TAMSULOSIN HYDROCHLORIDE 0.4 MG/1
0.4 CAPSULE ORAL DAILY
COMMUNITY
Start: 2022-04-09

## 2022-04-27 RX ORDER — PREGABALIN 50 MG/1
50 CAPSULE ORAL 3 TIMES DAILY
COMMUNITY
Start: 2022-04-23

## 2022-04-27 NOTE — LETTER
Diabetic Eye Exam Form    Date Requested: 22  Patient: Clemencia Hernandez  Patient : 1950   Referring Provider: Kathern Cranker Exam Date _______________________________    Type of Exam MUST be documented for Diabetic Eye Exams  Please CHECK ONE  Retinal Exam       Dilated Retinal Exam       OCT       Optomap-Iris Exam      Fundus Photography     Left Eye - Please check Retinopathy AND Type or No Retinopathy      Exam did show retinopathy    Exam did not show retinopathy         Mild     Proliferative           Moderate    Severe            None         Right Eye - Please check Retinopathy AND Type or No Retinopathy     Exam did show retinopathy    Exam did not show retinopathy         Mild     Proliferative        Moderate    Severe        None       Comments __________________________________________________________    Practice Providing Exam ______________________________________________    Exam Performed By (print name) _______________________________________      Provider Signature ___________________________________________________    These reports are needed for  compliance  Please fax this completed form and a copy of the Diabetic Eye Exam report to our office located at [place] [address] as soon as possible via [phone] attention Cheryl Chaparro: Phone [phone]  We thank you for your assistance in treating our mutual patient

## 2022-04-27 NOTE — PATIENT INSTRUCTIONS
Continue monitor diet, and maintain physical activity   Make sure to drink plenty water throughout the day      Check your blood sugars regularly      Continue Actos, glimepiride, and Metformin at current dose      Please continue to check your blood sugars at least twice daily at alternating times and send a record to the office in 2 weeks for review      Continue to follow up with Dr Debora Stringer for Podiatry      Continue Crestor      Consider follow up with medical nutrition therapy for help with your diet

## 2022-04-27 NOTE — TELEPHONE ENCOUNTER
----- Message from [facility] sent at 4/27/2022 10:03 AM EDT -----  Regarding: DM EYE EXAM  04/27/22 10:03 AM    Hello, our patient Sylvia Nixon has had a DM Eye Exam performed at [facility]  Their number is [phone]      Thank you,  [facility] Avtar Boggs

## 2022-04-27 NOTE — TELEPHONE ENCOUNTER
Upon review of the In Basket request and the patient's chart, initial outreach has been made via fax, please see Contacts section for details       Thank you  Kamari Lopez MA

## 2022-04-27 NOTE — PROGRESS NOTES
Zeny Hensley 81 y o  male MRN: [id number]    Encounter: [id number]      Assessment/Plan     Assessment: This is a 81y o -year-old male with Type 2 diabetes with hyperlipidemia  Plan:  1  Type 2 diabetes:  Most recent hemoglobin A1c remains relatively stable at 7 1  No adjustments to his medication at this time  He will continue with Actos 45 mg daily, glimepiride 4 mg twice a day, metformin 1000 mg twice a day  He does have an upcoming appointment with his podiatrist, and they are still plan on amputation of his left foot  Once again recommend that he check blood sugar twice a day at alternating times  Contact the office with any concerns or questions  Follow-up in 3 months with lab work completed prior to visit      2  Hyperlipidemia:  Previous lipid panel was excellent  Continue with current medication  Will continue monitor over time  CC: Type 2 diabetes follow-up    History of Present Illness     HPI:  76 year old male with type 2 diabetes for about 21 years   He is on oral agents at home and takes Actos 45 mg daily, Metformin 1000 twice daily, Glimepiride 4 mg twice a day   His most recent hemoglobin A1c from April 20, 2022 was 7 1   He denies any episodes of hypoglycemia, polyuria, polydipsia, nocturia and blurry vision   He denies nephropathy and retinopathy but does admit to neuropathy    He has a history of a diabetic foot ulcer and follows Dr Kam Isaacs for regular diabetic foot care   Has recovered from his episode of sepsis and complications of diabetic foot ulcer at the end of November beginning of December 2020   Continues to follow up with Podiatry  Will likely need an amputation of his left foot   He denies retinopathy or blurry vision but states that he is due for a diabetic eye exam   His most recent diabetic foot exam was performed on January 2021  Currently left foot is in a boot  Was recommended by podiatrist that he will likely need an amputation    Continues to be under lot of stress with concern of wife's medical conditions  She is currently on dialysis, and also has been diagnosed with heart failure  Her condition is currently stable      Blood Sugar/Glucometer/Pump/CGM review:  Has not been checking blood sugars recently      For his hyperlipidemia, he is treated with Crestor 5 mg daily  Denies any headaches, vision changes, chest pain, mi or stroke-like symptoms  Review of Systems   Constitutional: Negative for activity change, appetite change, fatigue and unexpected weight change  HENT: Negative for trouble swallowing  Eyes: Negative for visual disturbance  Respiratory: Negative for chest tightness and shortness of breath  Cardiovascular: Negative for chest pain, palpitations and leg swelling  Gastrointestinal: Negative for abdominal pain, diarrhea, nausea and vomiting  Endocrine: Negative for cold intolerance, heat intolerance, polydipsia, polyphagia and polyuria  Genitourinary: Negative for frequency  Musculoskeletal: Positive for arthralgias and gait problem (Utilizes cane)  Skin: Positive for wound (Left foot)  Negative for rash  Neurological: Positive for numbness  Negative for dizziness, weakness, light-headedness and headaches  Psychiatric/Behavioral: Negative for dysphoric mood and sleep disturbance  The patient is not nervous/anxious          Historical Information   Past Medical History:   Diagnosis Date    Charcot ankle, left     Diabetes mellitus ([address] )     Diabetic neuropathy (HCC)     GERD (gastroesophageal reflux disease) 12/28/2020    Hyperlipidemia     Iron deficiency anemia 12/28/2020    Personal history of colonic polyps 12/28/2020    Last colonoscopy 2016     Past Surgical History:   Procedure Laterality Date    ANKLE SURGERY Left     CARPAL TUNNEL RELEASE      REPLACEMENT TOTAL KNEE Left      Social History   Social History     Substance and Sexual Activity   Alcohol Use Yes     Social History     Substance and Sexual Activity   Drug Use Never     Social History     Tobacco Use   Smoking Status Former Smoker    Packs/day: 0 75    Years: 15 00    Pack years: 11 25    Types: Cigarettes    Start date: 8/14/1983   Smokeless Tobacco Never Used     Family History:   Family History   Problem Relation Age of Onset    Hypertension Mother     No Known Problems Father     Diabetes type II Sister     No Known Problems Brother     Diabetes type II Sister     Colon cancer Neg Hx     Colon polyps Neg Hx        Meds/Allergies   Current Outpatient Medications   Medication Sig Dispense Refill    aspirin (ECOTRIN LOW STRENGTH) 81 mg EC tablet Take 81 mg by mouth daily      famotidine (PEPCID) 20 mg tablet 2 (two) times a day       Ferrous Sulfate (Iron) 325 (65 Fe) MG TABS Take by mouth      furosemide (LASIX) 20 mg tablet Take 20 mg by mouth daily (Patient not taking: Reported on 6/17/2021)      gabapentin (NEURONTIN) 100 mg capsule Take 500 mg by mouth 3 (three) times a day       glimepiride (AMARYL) 4 mg tablet TAKE 1 TABLET BY MOUTH TWO TIMES A DAY AT BREAKFAST AND DINNER 180 tablet 1    glucose blood (EVERETT CONTOUR TEST) test strip every 24 hours      metFORMIN (GLUCOPHAGE) 1000 MG tablet Take 1 tablet (1,000 mg total) by mouth 2 (two) times a day with meals 180 tablet 1    Multiple Vitamin (multivitamin) capsule Take 1 capsule by mouth daily      pioglitazone (ACTOS) 45 mg tablet Take 1 tablet (45 mg total) by mouth daily 90 tablet 1    pregabalin (LYRICA) 25 mg capsule Take 50 mg by mouth 3 (three) times a day      rosuvastatin (CRESTOR) 5 mg tablet Take 5 mg by mouth daily      traMADol (ULTRAM) 50 mg tablet Take 50 mg by mouth every 6 (six) hours       No current facility-administered medications for this visit  Allergies   Allergen Reactions    Penicillin G      Other reaction(s): Unknown       Objective   Vitals: There were no vitals taken for this visit      Physical Exam  Vitals and nursing note reviewed  Constitutional:       General: He is not in acute distress  Appearance: Normal appearance  He is not diaphoretic  HENT:      Head: Normocephalic and atraumatic  Eyes:      General: No scleral icterus  Extraocular Movements: Extraocular movements intact  Conjunctiva/sclera: Conjunctivae normal       Pupils: Pupils are equal, round, and reactive to light  Cardiovascular:      Rate and Rhythm: Normal rate and regular rhythm  Heart sounds: No murmur heard  Pulmonary:      Effort: Pulmonary effort is normal  No respiratory distress  Breath sounds: Normal breath sounds  No wheezing  Musculoskeletal:      Right lower leg: No edema  Left lower leg: No edema  Lymphadenopathy:      Cervical: No cervical adenopathy  Skin:     General: Skin is warm and dry  Neurological:      Mental Status: He is alert and oriented to person, place, and time  Mental status is at baseline  Sensory: No sensory deficit  Gait: Gait normal    Psychiatric:         Mood and Affect: Mood normal          Behavior: Behavior normal          Thought Content: Thought content normal      Patient's shoes and socks were not removed  The history was obtained from the review of the chart, patient      Lab Results:   Lab Results   Component Value Date/Time    Hemoglobin A1C 7 1 (H) 04/20/2022 09:16 AM    Hemoglobin A1C 7 2 (H) 12/14/2021 10:32 AM    Hemoglobin A1C 7 0 (H) 09/14/2021 10:50 AM    BUN 18 04/20/2022 09:16 AM    BUN 16 12/14/2021 10:32 AM    BUN 18 09/14/2021 10:50 AM    Potassium 4 7 04/20/2022 09:16 AM    Potassium 4 5 12/14/2021 10:32 AM    Potassium 4 9 09/14/2021 10:50 AM    Chloride 106 04/20/2022 09:16 AM    Chloride 103 12/14/2021 10:32 AM    Chloride 102 09/14/2021 10:50 AM    CO2 23 04/20/2022 09:16 AM    CO2 26 12/14/2021 10:32 AM    CO2 25 09/14/2021 10:50 AM    Creatinine 0 91 04/20/2022 09:16 AM    Creatinine 0 93 12/14/2021 10:32 AM    Creatinine 0 99 09/14/2021 10:50 AM    AST 23 04/20/2022 09:16 AM    AST 29 12/14/2021 10:32 AM    AST 21 09/14/2021 10:50 AM    ALT 21 04/20/2022 09:16 AM    ALT 22 12/14/2021 10:32 AM    ALT 15 09/14/2021 10:50 AM    Albumin 4 2 04/20/2022 09:16 AM    Albumin 3 9 12/14/2021 10:32 AM    Albumin 4 1 09/14/2021 10:50 AM    Globulin, Total 2 4 04/20/2022 09:16 AM    Globulin, Total 2 3 12/14/2021 10:32 AM    Globulin, Total 2 7 09/14/2021 10:50 AM    HDL 43 12/14/2021 10:32 AM    HDL 48 06/10/2021 12:41 PM    Triglycerides 52 12/14/2021 10:32 AM    Triglycerides 121 06/10/2021 12:41 PM         Portions of the record may have been created with voice recognition software  Occasional wrong word or "sound a like" substitutions may have occurred due to the inherent limitations of voice recognition software  Read the chart carefully and recognize, using context, where substitutions have occurred

## 2022-04-28 ENCOUNTER — TELEPHONE (OUTPATIENT)
Dept: ADMINISTRATIVE | Facility: OTHER | Age: 72
End: 2022-04-28

## 2022-04-28 NOTE — LETTER
Diabetic Foot Exam Form    Date Requested: 22  Patient: Jimbo Haywood  Patient : 1950   Referring Provider: Patt Kirk    Diabetic Foot Exam Performed with shoes and socks removed        Yes         No     Date of Diabetic Foot Exam ______________________________  Risk Score ____________________________________________    Left Foot       Visual Inspection         Monofilament Testing Sensory Exam        Pedal Pulses         Additional Comments         Right Foot      Visual Inspection         Monofilament Testing Sensory Exam       Pedal Pulses         Additional Comments         Comments __________________________________________________________    Practice Providing Exam ______________________________________________    Exam Performed By (print name) _______________________________________      Provider Signature ___________________________________________________      These reports are needed for  compliance  Please fax this completed form and a copy of the Diabetic Foot Exam report to our office located at [place] [address] as soon as possible via [phone] kenny Eli: Phone [phone]    We thank you for your assistance in treating our mutual patient

## 2022-04-28 NOTE — TELEPHONE ENCOUNTER
Upon review of the In Basket request we were able to locate, review, and update the patient chart as requested for Diabetic Foot Exam     Any additional questions or concerns should be emailed to the Practice Liaisons via [email or web address] email, please do not reply via In Basket      Thank you  Katlyn Avila MA

## 2022-04-28 NOTE — TELEPHONE ENCOUNTER
----- Message from [facility] sent at 4/27/2022  2:27 PM EDT -----  Regarding: DM FOOT EXAM  04/27/22 2:27 PM    Hello, our patient Sylvia Nixon has had a DM Foot Exam performed by Dr Chey Jansen  His number is [phone]      Thank you,  [address] [place] [address] [facility]

## 2022-04-28 NOTE — TELEPHONE ENCOUNTER
Upon review of the In Basket request and the patient's chart, initial outreach has been made via fax, please see Contacts section for details       Thank you  Katlyn Avila MA

## 2022-05-02 NOTE — TELEPHONE ENCOUNTER
Upon review of the In Basket request we spoke with facility - last DM Eye exam was 2020 - patient seen for injections only as of now    Any additional questions or concerns should be emailed to the Practice Liaisons via [email or web address] email, please do not reply via In Basket      Thank you  Brandyn Acosta MA

## 2022-07-06 DIAGNOSIS — E11.8 TYPE 2 DIABETES MELLITUS WITH COMPLICATION (HCC): ICD-10-CM

## 2022-07-06 RX ORDER — PIOGLITAZONEHYDROCHLORIDE 45 MG/1
45 TABLET ORAL DAILY
Qty: 90 TABLET | Refills: 1 | Status: SHIPPED | OUTPATIENT
Start: 2022-07-06

## 2022-07-21 LAB
ALBUMIN SERPL-MCNC: 4.4 G/DL (ref 3.8–4.8)
ALBUMIN/GLOB SERPL: 1.8 {RATIO} (ref 1.2–2.2)
ALP SERPL-CCNC: 145 IU/L (ref 44–121)
ALT SERPL-CCNC: 20 IU/L (ref 0–44)
AST SERPL-CCNC: 23 IU/L (ref 0–40)
BILIRUB SERPL-MCNC: 0.3 MG/DL (ref 0–1.2)
BUN SERPL-MCNC: 24 MG/DL (ref 8–27)
BUN/CREAT SERPL: 24 (ref 10–24)
CALCIUM SERPL-MCNC: 9.5 MG/DL (ref 8.6–10.2)
CHLORIDE SERPL-SCNC: 105 MMOL/L (ref 96–106)
CO2 SERPL-SCNC: 23 MMOL/L (ref 20–29)
CREAT SERPL-MCNC: 0.98 MG/DL (ref 0.76–1.27)
EGFR: 83 ML/MIN/1.73
EST. AVERAGE GLUCOSE BLD GHB EST-MCNC: 177 MG/DL
GLOBULIN SER-MCNC: 2.4 G/DL (ref 1.5–4.5)
GLUCOSE SERPL-MCNC: 103 MG/DL (ref 65–99)
HBA1C MFR BLD: 7.8 % (ref 4.8–5.6)
POTASSIUM SERPL-SCNC: 4.8 MMOL/L (ref 3.5–5.2)
PROT SERPL-MCNC: 6.8 G/DL (ref 6–8.5)
SODIUM SERPL-SCNC: 142 MMOL/L (ref 134–144)

## 2022-08-15 DIAGNOSIS — E11.42 TYPE 2 DIABETES MELLITUS WITH DIABETIC POLYNEUROPATHY, WITHOUT LONG-TERM CURRENT USE OF INSULIN (HCC): ICD-10-CM

## 2022-08-15 RX ORDER — GLIMEPIRIDE 4 MG/1
4 TABLET ORAL 2 TIMES DAILY
Qty: 180 TABLET | Refills: 1 | Status: SHIPPED | OUTPATIENT
Start: 2022-08-15

## 2022-08-16 ENCOUNTER — OFFICE VISIT (OUTPATIENT)
Dept: ENDOCRINOLOGY | Facility: HOSPITAL | Age: 72
End: 2022-08-16
Payer: MEDICARE

## 2022-08-16 VITALS
DIASTOLIC BLOOD PRESSURE: 70 MMHG | SYSTOLIC BLOOD PRESSURE: 130 MMHG | HEIGHT: 70 IN | WEIGHT: 253 LBS | BODY MASS INDEX: 36.22 KG/M2 | HEART RATE: 82 BPM | OXYGEN SATURATION: 97 %

## 2022-08-16 DIAGNOSIS — E11.42 TYPE 2 DIABETES MELLITUS WITH DIABETIC POLYNEUROPATHY, WITHOUT LONG-TERM CURRENT USE OF INSULIN (HCC): Primary | ICD-10-CM

## 2022-08-16 PROCEDURE — 99214 OFFICE O/P EST MOD 30 MIN: CPT | Performed by: PHYSICIAN ASSISTANT

## 2022-08-16 NOTE — PATIENT INSTRUCTIONS
Continue monitor diet, and maintain physical activity  Make sure to drink plenty water throughout the day  Check your blood sugars regularly  Continue Actos, glimepiride, and Metformin at current dose  Please continue to check your blood sugars at least twice daily at alternating times and send a record to the office in 2 weeks for review  Continue to follow up with Dr Oscar Durant for Podiatry  Continue Crestor  Consider follow up with medical nutrition therapy for help with your diet  Follow up in 3 months with lab work prior to visit

## 2022-08-16 NOTE — PROGRESS NOTES
Alberto Campbell 81 y o  male MRN: [id number]    Encounter: [id number]      Assessment/Plan     Assessment: This is a 81y o -year-old male with type 2 diabetes with hyperlipidemia  Plan:  1  Type 2 diabetes:  Most recent hemoglobin A1c was 7 8, unfortunately has increased since last office visit  At this time he will make lifestyle modifications to help improve glucose levels  Did discuss that if levels do not improve, will have to consider adding a 4th medication  Encouraged him to check blood sugar 2 more times a day, and send in blood sugar logs in 2 weeks for review so we can see if any other recommendations can be made  Contact the office with any concerns or questions  Follow-up in 3 months with lab work completed prior to visit      2  Hyperlipidemia:  Previous lipid panel was excellent  Continue with current medication  Will continue monitor over time  CC:  Type 2 diabetes follow-up    History of Present Illness     HPI:  76 year old male with type 2 diabetes for about 21 years   He is on oral agents at home and takes Actos 45 mg daily, Metformin 1000 twice daily, Glimepiride 4 mg twice a day   His most recent hemoglobin A1c from July 20, 2022 was 7 8   He denies any episodes of hypoglycemia, polyuria, polydipsia, nocturia and blurry vision   He denies nephropathy and retinopathy but does admit to neuropathy   He has a history of a diabetic foot ulcer and follows Dr Maynor Rollins for regular diabetic foot care   Has recovered from his episode of sepsis and complications of diabetic foot ulcer at the end of November beginning of December 2020   Continues to follow up with Podiatry  Will likely need an amputation of his left foot    Nothing is scheduled at this point time, but does have an appointment with Podiatry this week  Raza Carl denies retinopathy or blurry vision but states that he is due for a diabetic eye exam   His most recent diabetic foot exam was performed on January 2021   Currently left foot is in a boot   Was recommended by podiatrist that he will likely need an amputation  Not only is he continuing to have pain in his right foot, has been having complications in his right knee and right hip also  Could be due to change in his gait to compensate for the pain      Blood Sugar/Glucometer/Pump/CGM review:  Has been checking blood sugars 2 more times a day  Did not bring in blood sugar logs into the office  States that his average blood sugar has been around 140       For his hyperlipidemia, he is treated with Crestor 5 mg daily  Denies any headaches, vision changes, chest pain, MI or stroke-like symptoms  Review of Systems   Constitutional: Negative for activity change, appetite change, fatigue and unexpected weight change  HENT: Negative for trouble swallowing  Eyes: Negative for visual disturbance  Respiratory: Negative for chest tightness and shortness of breath  Cardiovascular: Negative for chest pain, palpitations and leg swelling  Gastrointestinal: Negative for abdominal pain, diarrhea, nausea and vomiting  Endocrine: Negative for cold intolerance, heat intolerance, polydipsia, polyphagia and polyuria  Genitourinary: Negative for frequency  Musculoskeletal: Positive for arthralgias and gait problem (Utilizes cane)  Skin: Positive for wound (Left foot)  Negative for rash  Neurological: Positive for numbness  Negative for dizziness, weakness, light-headedness and headaches  Psychiatric/Behavioral: Negative for dysphoric mood and sleep disturbance  The patient is not nervous/anxious          Historical Information   Past Medical History:   Diagnosis Date    Charcot ankle, left     Diabetes mellitus ([address] )     Diabetic neuropathy (HCC)     GERD (gastroesophageal reflux disease) 12/28/2020    Hyperlipidemia     Iron deficiency anemia 12/28/2020    Personal history of colonic polyps 12/28/2020    Last colonoscopy 2016     Past Surgical History:   Procedure Laterality Date    ANKLE SURGERY Left     CARPAL TUNNEL RELEASE      REPLACEMENT TOTAL KNEE Left      Social History   Social History     Substance and Sexual Activity   Alcohol Use Yes     Social History     Substance and Sexual Activity   Drug Use Never     Social History     Tobacco Use   Smoking Status Former Smoker    Packs/day: 0 75    Years: 15 00    Pack years: 11 25    Types: Cigarettes    Start date: 8/14/1983   Smokeless Tobacco Never Used     Family History:   Family History   Problem Relation Age of Onset    Hypertension Mother     No Known Problems Father     Diabetes type II Sister     No Known Problems Brother     Diabetes type II Sister     Colon cancer Neg Hx     Colon polyps Neg Hx        Meds/Allergies   Current Outpatient Medications   Medication Sig Dispense Refill    aspirin (ECOTRIN LOW STRENGTH) 81 mg EC tablet Take 81 mg by mouth daily      famotidine (PEPCID) 20 mg tablet 2 (two) times a day       Ferrous Sulfate (Iron) 325 (65 Fe) MG TABS Take by mouth      furosemide (LASIX) 20 mg tablet Take 20 mg by mouth daily        gabapentin (NEURONTIN) 100 mg capsule Take 500 mg by mouth 3 (three) times a day       glimepiride (AMARYL) 4 mg tablet Take 1 tablet (4 mg total) by mouth 2 (two) times a day 180 tablet 1    glucose blood (EVERETT CONTOUR TEST) test strip every 24 hours      metFORMIN (GLUCOPHAGE) 1000 MG tablet Take 1 tablet (1,000 mg total) by mouth 2 (two) times a day with meals 180 tablet 1    Multiple Vitamin (multivitamin) capsule Take 1 capsule by mouth daily      pioglitazone (ACTOS) 45 mg tablet Take 1 tablet (45 mg total) by mouth daily 90 tablet 1    pregabalin (LYRICA) 50 mg capsule Take 50 mg by mouth 3 (three) times a day      rosuvastatin (CRESTOR) 5 mg tablet Take 5 mg by mouth daily      tamsulosin (FLOMAX) 0 4 mg Take 0 4 mg by mouth daily      traMADol (ULTRAM) 50 mg tablet Take 50 mg by mouth every 6 (six) hours       No current facility-administered medications for this visit  Allergies   Allergen Reactions    Penicillin G      Other reaction(s): Unknown       Objective   Vitals: There were no vitals taken for this visit  Physical Exam  Vitals and nursing note reviewed  Constitutional:       General: He is not in acute distress  Appearance: Normal appearance  He is not diaphoretic  HENT:      Head: Normocephalic and atraumatic  Eyes:      General: No scleral icterus  Extraocular Movements: Extraocular movements intact  Conjunctiva/sclera: Conjunctivae normal       Pupils: Pupils are equal, round, and reactive to light  Cardiovascular:      Rate and Rhythm: Normal rate and regular rhythm  Heart sounds: No murmur heard  Pulmonary:      Effort: Pulmonary effort is normal  No respiratory distress  Breath sounds: Normal breath sounds  No wheezing  Musculoskeletal:      Cervical back: Normal range of motion  Right lower leg: No edema  Left lower leg: No edema  Lymphadenopathy:      Cervical: No cervical adenopathy  Skin:     General: Skin is warm and dry  Neurological:      Mental Status: He is alert and oriented to person, place, and time  Mental status is at baseline  Sensory: No sensory deficit  Gait: Gait abnormal (Utilizes a cane)  Psychiatric:         Mood and Affect: Mood normal          Behavior: Behavior normal          Thought Content: Thought content normal          The history was obtained from the review of the chart, patient      Lab Results:   Lab Results   Component Value Date/Time    Hemoglobin A1C 7 8 (H) 07/20/2022 01:40 PM    Hemoglobin A1C 7 1 (H) 04/20/2022 09:16 AM    Hemoglobin A1C 7 2 (H) 12/14/2021 10:32 AM    BUN 24 07/20/2022 01:40 PM    BUN 18 04/20/2022 09:16 AM    BUN 16 12/14/2021 10:32 AM    Potassium 4 8 07/20/2022 01:40 PM    Potassium 4 7 04/20/2022 09:16 AM    Potassium 4 5 12/14/2021 10:32 AM    Chloride 105 07/20/2022 01:40 PM    Chloride 106 04/20/2022 09:16 AM    Chloride 103 12/14/2021 10:32 AM    CO2 23 07/20/2022 01:40 PM    CO2 23 04/20/2022 09:16 AM    CO2 26 12/14/2021 10:32 AM    Creatinine 0 98 07/20/2022 01:40 PM    Creatinine 0 91 04/20/2022 09:16 AM    Creatinine 0 93 12/14/2021 10:32 AM    AST 23 07/20/2022 01:40 PM    AST 23 04/20/2022 09:16 AM    AST 29 12/14/2021 10:32 AM    ALT 20 07/20/2022 01:40 PM    ALT 21 04/20/2022 09:16 AM    ALT 22 12/14/2021 10:32 AM    Albumin 4 4 07/20/2022 01:40 PM    Albumin 4 2 04/20/2022 09:16 AM    Albumin 3 9 12/14/2021 10:32 AM    Globulin, Total 2 4 07/20/2022 01:40 PM    Globulin, Total 2 4 04/20/2022 09:16 AM    Globulin, Total 2 3 12/14/2021 10:32 AM    HDL 43 12/14/2021 10:32 AM    Triglycerides 52 12/14/2021 10:32 AM       Portions of the record may have been created with voice recognition software  Occasional wrong word or "sound a like" substitutions may have occurred due to the inherent limitations of voice recognition software  Read the chart carefully and recognize, using context, where substitutions have occurred

## 2023-01-17 LAB
CREAT ?TM UR-SCNC: 172.6 UMOL/L
EXT MICROALBUMIN URINE RANDOM: 11.9
HBA1C MFR BLD HPLC: 9.6 %
MICROALBUMIN/CREAT UR: 7 MG/G{CREAT}

## 2023-01-30 DIAGNOSIS — E11.42 TYPE 2 DIABETES MELLITUS WITH DIABETIC POLYNEUROPATHY, WITHOUT LONG-TERM CURRENT USE OF INSULIN (HCC): ICD-10-CM

## 2023-02-28 ENCOUNTER — OFFICE VISIT (OUTPATIENT)
Dept: ENDOCRINOLOGY | Facility: HOSPITAL | Age: 73
End: 2023-02-28

## 2023-02-28 VITALS
BODY MASS INDEX: 34.65 KG/M2 | HEART RATE: 70 BPM | WEIGHT: 242 LBS | HEIGHT: 70 IN | SYSTOLIC BLOOD PRESSURE: 118 MMHG | DIASTOLIC BLOOD PRESSURE: 64 MMHG | OXYGEN SATURATION: 97 %

## 2023-02-28 DIAGNOSIS — E11.42 TYPE 2 DIABETES MELLITUS WITH DIABETIC POLYNEUROPATHY, WITHOUT LONG-TERM CURRENT USE OF INSULIN (HCC): Primary | ICD-10-CM

## 2023-02-28 DIAGNOSIS — E78.5 HYPERLIPIDEMIA, UNSPECIFIED HYPERLIPIDEMIA TYPE: ICD-10-CM

## 2023-02-28 RX ORDER — GABAPENTIN 600 MG/1
TABLET ORAL
COMMUNITY
Start: 2023-02-22

## 2023-02-28 RX ORDER — DULOXETIN HYDROCHLORIDE 30 MG/1
30 CAPSULE, DELAYED RELEASE ORAL DAILY
COMMUNITY
Start: 2023-02-22

## 2023-02-28 RX ORDER — PEN NEEDLE, DIABETIC 31 GX5/16"
NEEDLE, DISPOSABLE MISCELLANEOUS 2 TIMES DAILY
COMMUNITY
Start: 2023-02-24

## 2023-02-28 RX ORDER — LANCETS 33 GAUGE
EACH MISCELLANEOUS 3 TIMES DAILY
COMMUNITY
Start: 2023-02-27

## 2023-02-28 RX ORDER — HYDROXYZINE HYDROCHLORIDE 25 MG/1
25 TABLET, FILM COATED ORAL EVERY 8 HOURS PRN
COMMUNITY
Start: 2023-02-22

## 2023-02-28 RX ORDER — HYDROCODONE BITARTRATE AND ACETAMINOPHEN 5; 325 MG/1; MG/1
TABLET ORAL
COMMUNITY
Start: 2023-02-23

## 2023-02-28 RX ORDER — INSULIN GLARGINE 100 [IU]/ML
INJECTION, SOLUTION SUBCUTANEOUS
COMMUNITY
Start: 2023-02-22

## 2023-02-28 NOTE — PATIENT INSTRUCTIONS
Continue monitor diet, and maintain physical activity  Make sure to drink plenty water throughout the day  Check your blood sugars regularly  Continue Actos, glimepiride, and Metformin at current dose  Continue Lantus 22/15 units a day  Please continue to check your blood sugars at least twice daily at alternating times and send a record to the office in 2 weeks for review  Continue to follow up with podiatry/wound care  Send in blood sugar logs in 2 weeks  Continue Crestor  Call with any concerns or questions  Follow up in 3 months with lab work prior to visit

## 2023-02-28 NOTE — PROGRESS NOTES
Kevin Iyer 72 y o  male MRN: [id number]    Encounter: [id number]      Assessment/Plan     Assessment: This is a 72y o -year-old male with type 2 diabetes with hyperlipidemia  Plan:  1  Type 2 diabetes:  Most recent hemoglobin A1c was 9 6  Has had a lot of stress going on, and was admitted to the hospital for about 1 month  This is likely because of his increased A1c  He was noted on insulin during his hospitalization, and we will continue with it at this time  He will take Lantus 22 units in the a m  and 15 units in the p m , glimepiride 4 mg twice a day, metformin 1000 mg twice a day, and Actos 45 mg daily  Encouraged him to check blood sugar 2 or more times a day and send in blood sugar logs in 2 weeks so we can help manage his medications, and to prevent any further complications associated with his diabetes  Contact the office with any concerns or questions  Follow-up in 3 months with lab work completed prior to visit      2  Hyperlipidemia:  Previous lipid panel was excellent   Continue with current medication   Will continue monitor over time  CC: Type 2 diabetes follow-up    History of Present Illness     HPI:  74 year old male with type 2 diabetes for about 22 years   He is on oral agents and insulin at home and takes Actos 45 mg daily, Metformin 1000 twice daily, Glimepiride 4 mg twice a day, Lantus 22 units in the morning and 15 units in evening   His most recent hemoglobin A1c from January 17, 2023 was 9 6   He denies any episodes of hypoglycemia, polyuria, polydipsia, nocturia and blurry vision   He denies nephropathy and retinopathy but does admit to neuropathy  Did have a recent amputation of first digit on right foot due to osteomyelitis   Continues to follow up with Podiatry   Will likely need an amputation of his left foot  He denies retinopathy or blurry vision but states that he is due for a diabetic eye exam       Patient was admitted to [facility] for about 1 month  Initial diagnosis was overdose of tramadol  States that he was under a lot of stress at home taking care of his wife and other issues that he tried to overdose on his tramadol  His hospitalization he did have an amputation of the first digit on his right foot as noted above  States that he is doing much better at this time and has been getting help  He was started on insulin during his hospitalization  Denies any recent episodes of hypoglycemia  His most recent diabetic foot exam was performed on January 2021   Was recommended by podiatrist that he will likely need an amputation  Not only is he continuing to have pain in his right foot, has been having complications in his right knee and right hip also  Could be due to change in his gait to compensate for the pain      Blood Sugar/Glucometer/Pump/CGM review: Has not been checking blood sugars recently      For his hyperlipidemia, he is treated with Crestor 5 mg daily   Denies any headaches, vision changes, chest pain, MI or stroke-like symptoms  Review of Systems   Constitutional: Negative for activity change, appetite change, fatigue and unexpected weight change  HENT: Negative for trouble swallowing  Eyes: Negative for visual disturbance  Respiratory: Negative for chest tightness and shortness of breath  Cardiovascular: Negative for chest pain, palpitations and leg swelling  Gastrointestinal: Negative for abdominal pain, diarrhea, nausea and vomiting  Endocrine: Negative for cold intolerance, heat intolerance, polydipsia, polyphagia and polyuria  Genitourinary: Negative for frequency  Musculoskeletal: Positive for arthralgias and gait problem (Utilizing walker)  Skin: Positive for wound (Left foot and right foot)  Negative for rash  Neurological: Positive for numbness  Negative for dizziness, weakness, light-headedness and headaches  Psychiatric/Behavioral: Negative for dysphoric mood and sleep disturbance   The patient is not nervous/anxious          Historical Information   Past Medical History:   Diagnosis Date   • Charcot ankle, left    • Diabetes mellitus ([address] )    • Diabetic neuropathy (HCC)    • GERD (gastroesophageal reflux disease) 12/28/2020   • Hyperlipidemia    • Iron deficiency anemia 12/28/2020   • Personal history of colonic polyps 12/28/2020    Last colonoscopy 2016     Past Surgical History:   Procedure Laterality Date   • ANKLE SURGERY Left    • CARPAL TUNNEL RELEASE     • REPLACEMENT TOTAL KNEE Left    • TOE AMPUTATION Right 02/2023    great toe     Social History   Social History     Substance and Sexual Activity   Alcohol Use Yes     Social History     Substance and Sexual Activity   Drug Use Never     Social History     Tobacco Use   Smoking Status Former   • Packs/day: 0 75   • Years: 15 00   • Pack years: 11 25   • Types: Cigarettes   • Start date: 8/14/1983   Smokeless Tobacco Never     Family History:   Family History   Problem Relation Age of Onset   • Hypertension Mother    • No Known Problems Father    • Diabetes type II Sister    • No Known Problems Brother    • Diabetes type II Sister    • Colon cancer Neg Hx    • Colon polyps Neg Hx        Meds/Allergies   Current Outpatient Medications   Medication Sig Dispense Refill   • aspirin (ECOTRIN LOW STRENGTH) 81 mg EC tablet Take 81 mg by mouth daily     • [employer] Unifine Pentips Plus 31G X 8 MM MISC 2 (two) times a day Use as directed     • DULoxetine (CYMBALTA) 30 mg delayed release capsule Take 30 mg by mouth daily     • famotidine (PEPCID) 20 mg tablet 2 (two) times a day      • gabapentin (NEURONTIN) 600 MG tablet TAKE ONE TABLET BY MOUTH 3 TIMES DAILY AT 0600, 1400, AND 2200     • glimepiride (AMARYL) 4 mg tablet Take 1 tablet (4 mg total) by mouth 2 (two) times a day 180 tablet 1   • glucose blood test strip every 24 hours     • HYDROcodone-acetaminophen (NORCO) 5-325 mg per tablet TAKE ONE TABLET BY MOUTH EVERY 4 HOURS AS NEEDED FOR MODERATE PAIN     • hydrOXYzine HCL (ATARAX) 25 mg tablet Take 25 mg by mouth every 8 (eight) hours as needed     • Lancets (OneTouch Delica Plus [id number]) MISC 3 (three) times a day Test as directed     • Lantus SoloStar 100 units/mL SOPN 22 units in the morning 15 units at night     • metFORMIN (GLUCOPHAGE) 1000 MG tablet Take 1 tablet (1,000 mg total) by mouth 2 (two) times a day with meals 180 tablet 1   • Multiple Vitamin (multivitamin) capsule Take 1 capsule by mouth daily     • pioglitazone (ACTOS) 45 mg tablet Take 1 tablet (45 mg total) by mouth daily 90 tablet 1   • pregabalin (LYRICA) 50 mg capsule Take 50 mg by mouth 3 (three) times a day     • rosuvastatin (CRESTOR) 5 mg tablet Take 5 mg by mouth daily     • tamsulosin (FLOMAX) 0 4 mg Take 0 4 mg by mouth daily     • Ferrous Sulfate (Iron) 325 (65 Fe) MG TABS Take by mouth (Patient not taking: Reported on 2/28/2023)     • furosemide (LASIX) 20 mg tablet Take 20 mg by mouth daily   (Patient not taking: Reported on 8/16/2022)       No current facility-administered medications for this visit  Allergies   Allergen Reactions   • Penicillin G      Other reaction(s): Unknown       Objective   Vitals: Blood pressure 118/64, pulse 70, height 5' 10" (1 778 m), weight 110 kg (242 lb), SpO2 97 %  Physical Exam  Vitals and nursing note reviewed  Constitutional:       General: He is not in acute distress  Appearance: Normal appearance  He is not diaphoretic  HENT:      Head: Normocephalic and atraumatic  Eyes:      General: No scleral icterus  Extraocular Movements: Extraocular movements intact  Conjunctiva/sclera: Conjunctivae normal       Pupils: Pupils are equal, round, and reactive to light  Cardiovascular:      Rate and Rhythm: Normal rate and regular rhythm  Heart sounds: No murmur heard  Pulmonary:      Effort: Pulmonary effort is normal  No respiratory distress  Breath sounds: Normal breath sounds  No wheezing     Musculoskeletal: Cervical back: Normal range of motion  Right lower leg: No edema  Left lower leg: No edema  Lymphadenopathy:      Cervical: No cervical adenopathy  Skin:     General: Skin is warm and dry  Neurological:      Mental Status: He is alert and oriented to person, place, and time  Mental status is at baseline  Sensory: No sensory deficit  Gait: Gait normal    Psychiatric:         Mood and Affect: Mood normal          Behavior: Behavior normal          Thought Content: Thought content normal          The history was obtained from the review of the chart, patient  Lab Results:   Lab Results   Component Value Date/Time    Hemoglobin A1C 9 6 01/17/2023 12:00 AM    Hemoglobin A1C 7 8 (H) 07/20/2022 01:40 PM    Hemoglobin A1C 7 1 (H) 04/20/2022 09:16 AM    BUN 24 07/20/2022 01:40 PM    BUN 18 04/20/2022 09:16 AM    Potassium 4 8 07/20/2022 01:40 PM    Potassium 4 7 04/20/2022 09:16 AM    Chloride 105 07/20/2022 01:40 PM    Chloride 106 04/20/2022 09:16 AM    CO2 23 07/20/2022 01:40 PM    CO2 23 04/20/2022 09:16 AM    Creatinine 0 98 07/20/2022 01:40 PM    Creatinine 0 91 04/20/2022 09:16 AM    AST 23 07/20/2022 01:40 PM    AST 23 04/20/2022 09:16 AM    ALT 20 07/20/2022 01:40 PM    ALT 21 04/20/2022 09:16 AM    Albumin 4 4 07/20/2022 01:40 PM    Albumin 4 2 04/20/2022 09:16 AM    Globulin, Total 2 4 07/20/2022 01:40 PM    Globulin, Total 2 4 04/20/2022 09:16 AM       Portions of the record may have been created with voice recognition software  Occasional wrong word or "sound a like" substitutions may have occurred due to the inherent limitations of voice recognition software  Read the chart carefully and recognize, using context, where substitutions have occurred

## 2023-06-16 LAB
ALBUMIN SERPL-MCNC: 4.2 G/DL (ref 3.7–4.7)
ALBUMIN/GLOB SERPL: 1.6 {RATIO} (ref 1.2–2.2)
ALP SERPL-CCNC: 120 IU/L (ref 44–121)
ALT SERPL-CCNC: 22 IU/L (ref 0–44)
AST SERPL-CCNC: 27 IU/L (ref 0–40)
BILIRUB SERPL-MCNC: 0.2 MG/DL (ref 0–1.2)
BUN SERPL-MCNC: 18 MG/DL (ref 8–27)
BUN/CREAT SERPL: 15 (ref 10–24)
CALCIUM SERPL-MCNC: 9.3 MG/DL (ref 8.6–10.2)
CHLORIDE SERPL-SCNC: 107 MMOL/L (ref 96–106)
CO2 SERPL-SCNC: 24 MMOL/L (ref 20–29)
CREAT SERPL-MCNC: 1.17 MG/DL (ref 0.76–1.27)
EGFR: 67 ML/MIN/1.73
EST. AVERAGE GLUCOSE BLD GHB EST-MCNC: 137 MG/DL
GLOBULIN SER-MCNC: 2.7 G/DL (ref 1.5–4.5)
GLUCOSE SERPL-MCNC: 62 MG/DL (ref 70–99)
HBA1C MFR BLD: 6.4 % (ref 4.8–5.6)
POTASSIUM SERPL-SCNC: 4.7 MMOL/L (ref 3.5–5.2)
PROT SERPL-MCNC: 6.9 G/DL (ref 6–8.5)
SODIUM SERPL-SCNC: 144 MMOL/L (ref 134–144)

## 2023-06-21 ENCOUNTER — OFFICE VISIT (OUTPATIENT)
Dept: ENDOCRINOLOGY | Facility: HOSPITAL | Age: 73
End: 2023-06-21
Payer: MEDICARE

## 2023-06-21 VITALS
SYSTOLIC BLOOD PRESSURE: 126 MMHG | HEART RATE: 67 BPM | OXYGEN SATURATION: 96 % | DIASTOLIC BLOOD PRESSURE: 70 MMHG | WEIGHT: 257 LBS | BODY MASS INDEX: 36.79 KG/M2 | HEIGHT: 70 IN

## 2023-06-21 DIAGNOSIS — E13.21 OTHER SPECIFIED DIABETES MELLITUS WITH DIABETIC NEPHROPATHY, WITH LONG-TERM CURRENT USE OF INSULIN (HCC): ICD-10-CM

## 2023-06-21 DIAGNOSIS — E66.01 OBESITY, MORBID (HCC): ICD-10-CM

## 2023-06-21 DIAGNOSIS — E78.5 HYPERLIPIDEMIA, UNSPECIFIED HYPERLIPIDEMIA TYPE: ICD-10-CM

## 2023-06-21 DIAGNOSIS — Z79.4 OTHER SPECIFIED DIABETES MELLITUS WITH DIABETIC NEPHROPATHY, WITH LONG-TERM CURRENT USE OF INSULIN (HCC): ICD-10-CM

## 2023-06-21 DIAGNOSIS — E11.42 TYPE 2 DIABETES MELLITUS WITH DIABETIC POLYNEUROPATHY, WITHOUT LONG-TERM CURRENT USE OF INSULIN (HCC): Primary | ICD-10-CM

## 2023-06-21 PROCEDURE — 99214 OFFICE O/P EST MOD 30 MIN: CPT | Performed by: PHYSICIAN ASSISTANT

## 2023-06-21 RX ORDER — SILDENAFIL 100 MG/1
100 TABLET, FILM COATED ORAL DAILY PRN
COMMUNITY
Start: 2023-06-13

## 2023-06-21 RX ORDER — RIVAROXABAN 20 MG/1
20 TABLET, FILM COATED ORAL DAILY
COMMUNITY
Start: 2023-06-03

## 2023-06-21 RX ORDER — CLOPIDOGREL BISULFATE 75 MG/1
75 TABLET ORAL DAILY
COMMUNITY
Start: 2023-06-02

## 2023-06-21 NOTE — PROGRESS NOTES
Kenia May 72 y o  male MRN: [id number]    Encounter: [id number]      Assessment/Plan     Assessment: This is a 72y o -year-old male with type 2 diabetes with hyperlipidemia  Plan:  1  Type 2 diabetes:  Most recent hemoglobin A1c was 6 4  Unfortunately he does not have glucose logs present at today's office visit  He was having an episode of hypoglycemia with his lab work though  Denies any symptoms of hypothyroidism  He will take Lantus 15 units twice a day, glimepiride 4 mg twice a day, metformin 1000 mg twice a day, and Actos 30 mg daily  Encouraged him to check blood sugar 2 or more times a day and send in blood sugar logs in 2 weeks so we can help manage his medications, and to prevent any further complications associated with his diabetes  Contact the office with any concerns or questions   Follow-up in 3 months with lab work completed prior to visit      2  Hyperlipidemia:  Previous lipid panel was excellent   Continue with current medication   Will continue monitor over time  CC: Type 2 diabetes follow-up    History of Present Illness     HPI:  74 year old male with type 2 diabetes for about 22 years   He is on oral agents and insulin at home and takes Actos 45 mg daily, Metformin 1000 twice daily, Glimepiride 4 mg twice a day, Lantus 15 units twice a day   His most recent hemoglobin A1c from  Mansi 15, 2023 was 6 4   He denies any episodes of hypoglycemia, polyuria, polydipsia, nocturia and blurry vision   He denies nephropathy and retinopathy but does admit to neuropathy  Did have a recent amputation of first digit on right foot due to osteomyelitis  Right foot is doing well at this time   Continues to follow up with Podiatry  At this time is planning on an amputation of his left foot sometime this winter  He denies retinopathy or blurry vision but states that he is due for a diabetic eye exam   Fortunately has been under a lot of stress recently    His wife is currently in hospice as dialysis has been failing      Denies any recent episodes of hypoglycemia or symptoms of hypoglycemia  However, glucose at the time of lab work was 58      His most recent diabetic foot exam was performed on January 2021   Was recommended by podiatrist that he will likely need an amputation   Not only is he continuing to have pain in his right foot, has been having complications in his right knee and right hip also   Could be due to change in his gait to compensate for the pain      Blood Sugar/Glucometer/Pump/CGM review: Has not been checking blood sugars recently      For his hyperlipidemia, he is treated with Crestor 5 mg daily   Denies any headaches, vision changes, chest pain, MI or stroke-like symptoms  Review of Systems   Constitutional: Negative for activity change, appetite change, fatigue and unexpected weight change  HENT: Negative for trouble swallowing  Eyes: Negative for visual disturbance  Respiratory: Negative for chest tightness and shortness of breath  Cardiovascular: Negative for chest pain, palpitations and leg swelling  Gastrointestinal: Negative for abdominal pain, diarrhea, nausea and vomiting  Endocrine: Negative for cold intolerance, heat intolerance, polydipsia, polyphagia and polyuria  Genitourinary: Negative for frequency  Musculoskeletal: Positive for arthralgias and gait problem (Utilizing walker)  Skin: Positive for wound (Left foot and right foot)  Negative for rash  Neurological: Positive for numbness  Negative for dizziness, weakness, light-headedness and headaches  Psychiatric/Behavioral: Positive for dysphoric mood (wife is in hospice)  Negative for sleep disturbance  The patient is not nervous/anxious          Historical Information   Past Medical History:   Diagnosis Date   • Charcot ankle, left    • Diabetes mellitus [address] [address] )    • Diabetic neuropathy (HCC)    • GERD (gastroesophageal reflux disease) 12/28/2020   • Hyperlipidemia    • Iron deficiency anemia 12/28/2020   • Personal history of colonic polyps 12/28/2020    Last colonoscopy 2016     Past Surgical History:   Procedure Laterality Date   • ANKLE SURGERY Left    • CARPAL TUNNEL RELEASE     • REPLACEMENT TOTAL KNEE Left    • TOE AMPUTATION Right 02/2023    great toe     Social History   Social History     Substance and Sexual Activity   Alcohol Use Yes     Social History     Substance and Sexual Activity   Drug Use Never     Social History     Tobacco Use   Smoking Status Former   • Packs/day: 0 75   • Years: 15 00   • Total pack years: 11 25   • Types: Cigarettes   • Start date: 8/14/1983   Smokeless Tobacco Never     Family History:   Family History   Problem Relation Age of Onset   • Hypertension Mother    • No Known Problems Father    • Diabetes type II Sister    • No Known Problems Brother    • Diabetes type II Sister    • Colon cancer Neg Hx    • Colon polyps Neg Hx        Meds/Allergies   Current Outpatient Medications   Medication Sig Dispense Refill   • [employer] Unifine Pentips Plus 31G X 8 MM MISC 2 (two) times a day Use as directed     • clopidogrel (PLAVIX) 75 mg tablet Take 75 mg by mouth daily     • DULoxetine (CYMBALTA) 30 mg delayed release capsule Take 30 mg by mouth daily     • famotidine (PEPCID) 20 mg tablet 2 (two) times a day      • gabapentin (NEURONTIN) 600 MG tablet TAKE ONE TABLET BY MOUTH 3 TIMES DAILY AT 0600, 1400, AND 2200     • glimepiride (AMARYL) 4 mg tablet Take 1 tablet (4 mg total) by mouth 2 (two) times a day 180 tablet 1   • glucose blood test strip every 24 hours     • Lancets (OneTouch Delica Plus [id number]) MISC 3 (three) times a day Test as directed     • Lantus SoloStar 100 units/mL SOPN 15 units twice a day     • metFORMIN (GLUCOPHAGE) 1000 MG tablet Take 1 tablet (1,000 mg total) by mouth 2 (two) times a day with meals 180 tablet 1   • Multiple Vitamin (multivitamin) capsule Take 1 capsule by mouth daily     • pioglitazone (ACTOS) 45 mg tablet Take 1 tablet (45 "mg total) by mouth daily (Patient taking differently: Take 30 mg by mouth daily) 90 tablet 1   • pregabalin (LYRICA) 50 mg capsule Take 50 mg by mouth 3 (three) times a day     • rosuvastatin (CRESTOR) 5 mg tablet Take 5 mg by mouth daily     • sildenafil (VIAGRA) 100 mg tablet Take 100 mg by mouth daily as needed     • tamsulosin (FLOMAX) 0 4 mg Take 0 4 mg by mouth daily     • Xarelto 20 MG tablet Take 20 mg by mouth daily     • Ferrous Sulfate (Iron) 325 (65 Fe) MG TABS Take by mouth (Patient not taking: Reported on 2/28/2023)     • furosemide (LASIX) 20 mg tablet Take 20 mg by mouth daily   (Patient not taking: Reported on 8/16/2022)     • hydrOXYzine HCL (ATARAX) 25 mg tablet Take 25 mg by mouth every 8 (eight) hours as needed (Patient not taking: Reported on 6/21/2023)       No current facility-administered medications for this visit  Allergies   Allergen Reactions   • Penicillin G      Other reaction(s): Unknown       Objective   Vitals: Blood pressure 126/70, pulse 67, height 5' 10\" (1 778 m), weight 117 kg (257 lb), SpO2 96 %  Physical Exam  Vitals and nursing note reviewed  Constitutional:       General: He is not in acute distress  Appearance: Normal appearance  He is not diaphoretic  HENT:      Head: Normocephalic and atraumatic  Eyes:      General: No scleral icterus  Extraocular Movements: Extraocular movements intact  Conjunctiva/sclera: Conjunctivae normal       Pupils: Pupils are equal, round, and reactive to light  Cardiovascular:      Rate and Rhythm: Normal rate and regular rhythm  Heart sounds: No murmur heard  Pulmonary:      Effort: Pulmonary effort is normal  No respiratory distress  Breath sounds: Normal breath sounds  No wheezing  Musculoskeletal:      Cervical back: Normal range of motion  Right lower leg: No edema  Left lower leg: No edema  Lymphadenopathy:      Cervical: No cervical adenopathy     Skin:     General: Skin is warm and " "dry    Neurological:      Mental Status: He is alert and oriented to person, place, and time  Mental status is at baseline  Sensory: No sensory deficit  Gait: Gait abnormal (Utilizing a cane)  Psychiatric:         Mood and Affect: Mood normal          Behavior: Behavior normal          Thought Content: Thought content normal          The history was obtained from the review of the chart, patient  Lab Results:   Lab Results   Component Value Date/Time    Hemoglobin A1C 6 4 (H) 06/15/2023 11:34 AM    Hemoglobin A1C 9 6 01/17/2023 12:00 AM    Hemoglobin A1C 7 8 (H) 07/20/2022 01:40 PM    BUN 18 06/15/2023 11:34 AM    BUN 24 07/20/2022 01:40 PM    Potassium 4 7 06/15/2023 11:34 AM    Potassium 4 8 07/20/2022 01:40 PM    Chloride 107 (H) 06/15/2023 11:34 AM    Chloride 105 07/20/2022 01:40 PM    CO2 24 06/15/2023 11:34 AM    CO2 23 07/20/2022 01:40 PM    Creatinine 1 17 06/15/2023 11:34 AM    Creatinine 0 98 07/20/2022 01:40 PM    AST 27 06/15/2023 11:34 AM    AST 23 07/20/2022 01:40 PM    ALT 22 06/15/2023 11:34 AM    ALT 20 07/20/2022 01:40 PM    Protein, Total 6 9 06/15/2023 11:34 AM    Protein, Total 6 8 07/20/2022 01:40 PM    Albumin 4 2 06/15/2023 11:34 AM    Albumin 4 4 07/20/2022 01:40 PM    Globulin, Total 2 7 06/15/2023 11:34 AM    Globulin, Total 2 4 07/20/2022 01:40 PM         Portions of the record may have been created with voice recognition software  Occasional wrong word or \"sound a like\" substitutions may have occurred due to the inherent limitations of voice recognition software  Read the chart carefully and recognize, using context, where substitutions have occurred    "

## 2023-06-21 NOTE — PATIENT INSTRUCTIONS
Continue monitor diet, and maintain physical activity  Make sure to drink plenty water throughout the day  Check your blood sugars regularly  Continue glimepiride, and Metformin at current dose  Take Actos 30 mg daily  Continue Lantus 15 twice a day  Please continue to check your blood sugars at least twice daily at alternating times and send a record to the office in 2 weeks for review  Continue to follow up with podiatry/wound care  Send in blood sugar logs in 2 weeks  Continue Crestor  Call with any concerns or questions  Follow up in 3 months with lab work prior to visit

## 2023-07-17 LAB
LEFT EYE DIABETIC RETINOPATHY: POSITIVE
RIGHT EYE DIABETIC RETINOPATHY: POSITIVE

## 2023-09-30 LAB
ALBUMIN SERPL-MCNC: 4.2 G/DL (ref 3.8–4.8)
ALBUMIN/GLOB SERPL: 1.8 {RATIO} (ref 1.2–2.2)
ALP SERPL-CCNC: 110 IU/L (ref 44–121)
ALT SERPL-CCNC: 24 IU/L (ref 0–44)
AST SERPL-CCNC: 23 IU/L (ref 0–40)
BILIRUB SERPL-MCNC: 0.3 MG/DL (ref 0–1.2)
BUN SERPL-MCNC: 20 MG/DL (ref 8–27)
BUN/CREAT SERPL: 15 (ref 10–24)
CALCIUM SERPL-MCNC: 9.6 MG/DL (ref 8.6–10.2)
CHLORIDE SERPL-SCNC: 109 MMOL/L (ref 96–106)
CO2 SERPL-SCNC: 26 MMOL/L (ref 20–29)
CREAT SERPL-MCNC: 1.3 MG/DL (ref 0.76–1.27)
EGFR: 59 ML/MIN/1.73
EST. AVERAGE GLUCOSE BLD GHB EST-MCNC: 174 MG/DL
GLOBULIN SER-MCNC: 2.4 G/DL (ref 1.5–4.5)
GLUCOSE SERPL-MCNC: 124 MG/DL (ref 70–99)
HBA1C MFR BLD: 7.7 % (ref 4.8–5.6)
POTASSIUM SERPL-SCNC: 5.2 MMOL/L (ref 3.5–5.2)
PROT SERPL-MCNC: 6.6 G/DL (ref 6–8.5)
SODIUM SERPL-SCNC: 146 MMOL/L (ref 134–144)

## 2023-10-04 ENCOUNTER — OFFICE VISIT (OUTPATIENT)
Dept: ENDOCRINOLOGY | Facility: HOSPITAL | Age: 73
End: 2023-10-04
Payer: MEDICARE

## 2023-10-04 VITALS
DIASTOLIC BLOOD PRESSURE: 70 MMHG | WEIGHT: 265 LBS | BODY MASS INDEX: 37.94 KG/M2 | SYSTOLIC BLOOD PRESSURE: 118 MMHG | OXYGEN SATURATION: 95 % | HEIGHT: 70 IN | HEART RATE: 78 BPM

## 2023-10-04 DIAGNOSIS — E78.5 HYPERLIPIDEMIA, UNSPECIFIED HYPERLIPIDEMIA TYPE: ICD-10-CM

## 2023-10-04 DIAGNOSIS — E11.42 TYPE 2 DIABETES MELLITUS WITH DIABETIC POLYNEUROPATHY, WITHOUT LONG-TERM CURRENT USE OF INSULIN (HCC): Primary | ICD-10-CM

## 2023-10-04 PROCEDURE — 99214 OFFICE O/P EST MOD 30 MIN: CPT | Performed by: PHYSICIAN ASSISTANT

## 2023-10-04 NOTE — PATIENT INSTRUCTIONS
Continue monitor diet, and maintain physical activity. Make sure to drink plenty water throughout the day. Check your blood sugars regularly. Continue glimepiride, and Metformin at current dose. Take Actos 30 mg daily. Continue Lantus 15 twice a day. Please continue to check your blood sugars at least twice daily at alternating times and send a record to the office in 2 weeks for review. Continue to follow up with podiatry/wound care. Send in blood sugar logs in 2 weeks. Continue Crestor. Call with any concerns or questions. Follow up in 3 months with lab work prior to visit.

## 2023-10-04 NOTE — PROGRESS NOTES
Samson Bermudez 72 y.o. male MRN: [id number]    Encounter: [id number]      Assessment/Plan     Assessment: This is a 72y.o.-year-old male with type 2 diabetes with hyperlipidemia. Plan:  1. Type 2 diabetes: Recent hemoglobin A1c increased to 7.7. This could be due to inconsistent with medication, but this is expected as his wife has recently passed away. At this time recommend making adjustments to timing of medication to see if we can limit the missed doses. He will take Lantus 15 units twice a day, glimepiride 4 mg twice a day, metformin 1000 mg twice a day, and Actos 30 mg daily. Encouraged him to check blood sugar 2 or more times a day and send in blood sugar logs in 2 weeks so we can help manage his medications, and to prevent any further complications associated with his diabetes. Contact the office with any concerns or questions. Follow-up in 3 months with lab work completed prior to visit. 2. Hyperlipidemia:  Previous lipid panel was excellent. Continue with current medication. Will continue monitor over time. CC: Type 2 diabetes follow-up    History of Present Illness     HPI:  72year old male with type 2 diabetes for about 22 years. He is on oral agents and insulin at home and takes Actos 30 mg daily, Metformin 1000 twice daily, Glimepiride 4 mg twice a day, Lantus 15 units twice a day. His most recent hemoglobin A1c completed September 29, 2023 was 7.7. This is an increase, and likely due to inconsistency with medication. Unfortunately his wife passed away July 2023. States that he may fall asleep before taking his evening medications, and will wake up late in the morning in which case he may skip his morning doses then. He denies any episodes of hypoglycemia, polyuria, polydipsia, nocturia and blurry vision. He denies nephropathy and retinopathy but does admit to neuropathy. Both feet are doing well at this time.   Right foot is healing well after amputation of the first digit. Left foot is still giving him problems, and is still interested in getting an amputation. This may be plan for this winter. Denies any recent episodes of hypoglycemia or symptoms of hypoglycemia. His most recent diabetic foot exam was performed on January 2021. Was recommended by podiatrist that he will likely need an amputation. Not only is he continuing to have pain in his right foot, has been having complications in his right knee and right hip also. Could be due to change in his gait to compensate for the pain. Blood Sugar/Glucometer/Pump/CGM review: Has not been checking blood sugars recently. For his hyperlipidemia, he is treated with Crestor 5 mg daily. Denies any headaches, vision changes, chest pain, MI or stroke-like symptoms. Review of Systems   Constitutional:  Negative for activity change, appetite change, fatigue and unexpected weight change. HENT:  Negative for trouble swallowing. Eyes:  Negative for visual disturbance. Respiratory:  Negative for chest tightness and shortness of breath. Cardiovascular:  Negative for chest pain, palpitations and leg swelling. Gastrointestinal:  Negative for abdominal pain, diarrhea, nausea and vomiting. Endocrine: Negative for cold intolerance, heat intolerance, polydipsia, polyphagia and polyuria. Genitourinary:  Negative for frequency. Musculoskeletal:  Positive for arthralgias and gait problem (Utilizing walker). Skin:  Positive for wound (Left foot and right foot). Negative for rash. Neurological:  Positive for numbness. Negative for dizziness, weakness, light-headedness and headaches. Psychiatric/Behavioral:  Positive for dysphoric mood (Wife recently passed away). Negative for sleep disturbance. The patient is not nervous/anxious.         Historical Information   Past Medical History:   Diagnosis Date   • Charcot ankle, left    • Diabetes mellitus ([address])    • Diabetic neuropathy (HCC)    • GERD (gastroesophageal reflux disease) 12/28/2020   • Hyperlipidemia    • Iron deficiency anemia 12/28/2020   • Personal history of colonic polyps 12/28/2020    Last colonoscopy 2016     Past Surgical History:   Procedure Laterality Date   • ANKLE SURGERY Left    • CARPAL TUNNEL RELEASE     • REPLACEMENT TOTAL KNEE Left    • TOE AMPUTATION Right 02/2023    great toe     Social History   Social History     Substance and Sexual Activity   Alcohol Use Yes     Social History     Substance and Sexual Activity   Drug Use Never     Social History     Tobacco Use   Smoking Status Former   • Packs/day: 0.75   • Years: 15.00   • Total pack years: 11.25   • Types: Cigarettes   • Start date: 8/14/1983   Smokeless Tobacco Never     Family History:   Family History   Problem Relation Age of Onset   • Hypertension Mother    • No Known Problems Father    • Diabetes type II Sister    • No Known Problems Brother    • Diabetes type II Sister    • Colon cancer Neg Hx    • Colon polyps Neg Hx        Meds/Allergies   Current Outpatient Medications   Medication Sig Dispense Refill   • clopidogrel (PLAVIX) 75 mg tablet Take 75 mg by mouth daily     • DULoxetine (CYMBALTA) 30 mg delayed release capsule Take 30 mg by mouth daily     • famotidine (PEPCID) 20 mg tablet 2 (two) times a day      • gabapentin (NEURONTIN) 600 MG tablet TAKE ONE TABLET BY MOUTH 3 TIMES DAILY AT 0600, 1400, AND 2200     • glimepiride (AMARYL) 4 mg tablet Take 1 tablet (4 mg total) by mouth 2 (two) times a day 180 tablet 1   • glucose blood test strip every 24 hours     • Lancets (OneTouch Delica Plus [id number]) MISC 3 (three) times a day Test as directed     • Lantus SoloStar 100 units/mL SOPN 15 units twice a day     • metFORMIN (GLUCOPHAGE) 1000 MG tablet Take 1 tablet (1,000 mg total) by mouth 2 (two) times a day with meals 180 tablet 1   • Multiple Vitamin (multivitamin) capsule Take 1 capsule by mouth daily     • pioglitazone (ACTOS) 45 mg tablet Take 1 tablet (45 mg total) by mouth daily (Patient taking differently: Take 30 mg by mouth daily) 90 tablet 1   • pregabalin (LYRICA) 50 mg capsule Take 50 mg by mouth 3 (three) times a day     • rosuvastatin (CRESTOR) 5 mg tablet Take 5 mg by mouth daily     • sildenafil (VIAGRA) 100 mg tablet Take 100 mg by mouth daily as needed     • Xarelto 20 MG tablet Take 20 mg by mouth daily     • CareOne Unifine Pentips Plus 31G X 8 MM MISC 2 (two) times a day Use as directed     • Ferrous Sulfate (Iron) 325 (65 Fe) MG TABS Take by mouth (Patient not taking: Reported on 2/28/2023)     • furosemide (LASIX) 20 mg tablet Take 20 mg by mouth daily   (Patient not taking: Reported on 8/16/2022)     • hydrOXYzine HCL (ATARAX) 25 mg tablet Take 25 mg by mouth every 8 (eight) hours as needed (Patient not taking: Reported on 6/21/2023)     • tamsulosin (FLOMAX) 0.4 mg Take 0.4 mg by mouth daily (Patient not taking: Reported on 10/4/2023)       No current facility-administered medications for this visit. Allergies   Allergen Reactions   • Penicillin G      Other reaction(s): Unknown       Objective   Vitals: Blood pressure 118/70, pulse 78, height 5' 10" (1.778 m), weight 120 kg (265 lb), SpO2 95 %. Physical Exam  Vitals and nursing note reviewed. Constitutional:       General: He is not in acute distress. Appearance: Normal appearance. He is not diaphoretic. HENT:      Head: Normocephalic and atraumatic. Eyes:      General: No scleral icterus. Extraocular Movements: Extraocular movements intact. Conjunctiva/sclera: Conjunctivae normal.      Pupils: Pupils are equal, round, and reactive to light. Cardiovascular:      Rate and Rhythm: Normal rate and regular rhythm. Heart sounds: No murmur heard. Pulmonary:      Effort: Pulmonary effort is normal. No respiratory distress. Breath sounds: Normal breath sounds. No wheezing. Musculoskeletal:      Cervical back: Normal range of motion. Right lower leg: No edema. Left lower leg: No edema. Lymphadenopathy:      Cervical: No cervical adenopathy. Skin:     General: Skin is warm and dry. Neurological:      Mental Status: He is alert and oriented to person, place, and time. Mental status is at baseline. Sensory: No sensory deficit. Gait: Gait abnormal (Utilizing a cane). Psychiatric:         Mood and Affect: Mood normal.         Behavior: Behavior normal.         Thought Content: Thought content normal.         The history was obtained from the review of the chart, patient. Lab Results:   Lab Results   Component Value Date/Time    Hemoglobin A1C 7.7 (H) 09/29/2023 12:16 PM    Hemoglobin A1C 6.4 (H) 06/15/2023 11:34 AM    Hemoglobin A1C 9.6 01/17/2023 12:00 AM    BUN 20 09/29/2023 12:16 PM    BUN 18 06/15/2023 11:34 AM    Potassium 5.2 09/29/2023 12:16 PM    Potassium 4.7 06/15/2023 11:34 AM    Chloride 109 (H) 09/29/2023 12:16 PM    Chloride 107 (H) 06/15/2023 11:34 AM    CO2 26 09/29/2023 12:16 PM    CO2 24 06/15/2023 11:34 AM    Creatinine 1.30 (H) 09/29/2023 12:16 PM    Creatinine 1.17 06/15/2023 11:34 AM    AST 23 09/29/2023 12:16 PM    AST 27 06/15/2023 11:34 AM    ALT 24 09/29/2023 12:16 PM    ALT 22 06/15/2023 11:34 AM    Protein, Total 6.6 09/29/2023 12:16 PM    Protein, Total 6.9 06/15/2023 11:34 AM    Albumin 4.2 09/29/2023 12:16 PM    Albumin 4.2 06/15/2023 11:34 AM    Globulin, Total 2.4 09/29/2023 12:16 PM    Globulin, Total 2.7 06/15/2023 11:34 AM         Portions of the record may have been created with voice recognition software. Occasional wrong word or "sound a like" substitutions may have occurred due to the inherent limitations of voice recognition software. Read the chart carefully and recognize, using context, where substitutions have occurred.

## 2023-12-22 LAB
LEFT EYE DIABETIC RETINOPATHY: POSITIVE
RIGHT EYE DIABETIC RETINOPATHY: POSITIVE

## 2024-01-06 LAB
ALBUMIN SERPL-MCNC: 4.3 G/DL (ref 3.8–4.8)
ALBUMIN/CREAT UR: 10 MG/G CREAT (ref 0–29)
ALBUMIN/GLOB SERPL: 1.7 {RATIO} (ref 1.2–2.2)
ALP SERPL-CCNC: 96 IU/L (ref 44–121)
ALT SERPL-CCNC: 18 IU/L (ref 0–44)
AST SERPL-CCNC: 21 IU/L (ref 0–40)
BILIRUB SERPL-MCNC: 0.3 MG/DL (ref 0–1.2)
BUN SERPL-MCNC: 27 MG/DL (ref 8–27)
BUN/CREAT SERPL: 21 (ref 10–24)
CALCIUM SERPL-MCNC: 9.3 MG/DL (ref 8.6–10.2)
CHLORIDE SERPL-SCNC: 104 MMOL/L (ref 96–106)
CHOLEST SERPL-MCNC: 123 MG/DL (ref 100–199)
CHOLEST/HDLC SERPL: 2.7 RATIO (ref 0–5)
CO2 SERPL-SCNC: 25 MMOL/L (ref 20–29)
CREAT SERPL-MCNC: 1.27 MG/DL (ref 0.76–1.27)
CREAT UR-MCNC: 155 MG/DL
EGFR: 60 ML/MIN/1.73
EST. AVERAGE GLUCOSE BLD GHB EST-MCNC: 171 MG/DL
GLOBULIN SER-MCNC: 2.5 G/DL (ref 1.5–4.5)
GLUCOSE SERPL-MCNC: 136 MG/DL (ref 70–99)
HBA1C MFR BLD: 7.6 % (ref 4.8–5.6)
HDLC SERPL-MCNC: 45 MG/DL
LDLC SERPL CALC-MCNC: 58 MG/DL (ref 0–99)
MICROALBUMIN UR-MCNC: 14.8 UG/ML
POTASSIUM SERPL-SCNC: 5.1 MMOL/L (ref 3.5–5.2)
PROT SERPL-MCNC: 6.8 G/DL (ref 6–8.5)
SL AMB VLDL CHOLESTEROL CALC: 20 MG/DL (ref 5–40)
SODIUM SERPL-SCNC: 141 MMOL/L (ref 134–144)
TRIGL SERPL-MCNC: 112 MG/DL (ref 0–149)
TSH SERPL DL<=0.005 MIU/L-ACNC: 2.82 UIU/ML (ref 0.45–4.5)

## 2024-01-10 ENCOUNTER — OFFICE VISIT (OUTPATIENT)
Dept: ENDOCRINOLOGY | Facility: HOSPITAL | Age: 74
End: 2024-01-10
Payer: MEDICARE

## 2024-01-10 VITALS
BODY MASS INDEX: 41.09 KG/M2 | HEART RATE: 66 BPM | SYSTOLIC BLOOD PRESSURE: 138 MMHG | DIASTOLIC BLOOD PRESSURE: 72 MMHG | HEIGHT: 70 IN | OXYGEN SATURATION: 99 % | WEIGHT: 287 LBS

## 2024-01-10 DIAGNOSIS — E11.42 TYPE 2 DIABETES MELLITUS WITH DIABETIC POLYNEUROPATHY, WITHOUT LONG-TERM CURRENT USE OF INSULIN (HCC): Primary | ICD-10-CM

## 2024-01-10 DIAGNOSIS — E66.01 OBESITY, MORBID (HCC): ICD-10-CM

## 2024-01-10 PROCEDURE — 99214 OFFICE O/P EST MOD 30 MIN: CPT | Performed by: PHYSICIAN ASSISTANT

## 2024-01-10 NOTE — PROGRESS NOTES
Ayden Sierra 73 y.o. male MRN: [id number]    Encounter: [id number]      Assessment/Plan     Assessment:  This is a 73 y.o.-year-old male with type 2 diabetes and hyperlipidemia.    Plan:  1. Type 2 diabetes: Recent hemoglobin A1c increased to 7.6.  Insurance company will no longer cover Lantus, so we will switch him to Basaglar 15 units twice a day.  He does have concerns with his weight and we discussed medication adjustments to see if we can assist in weight loss.  At this time we will discontinue his Actos and start Ozempic 0.25 mg weekly.  He can increase to 0.5 mg weekly after 4 weeks if he is not having any side effects.  Did discuss possible side effects with starting Ozempic.  If there is any concerns, please contact the office.  He has also been experiencing episodes of hypoglycemia before dinner likely due to not having any lunch.  At this time we will discontinue his glimepiride with breakfast but he will continue with glimepiride 4 mg with dinner.  Continue with metformin 1000 mg twice a day.  He will continue checking blood sugars 2 or more times a day, but is interested in utilizing a Dexcom.  Contact the office with any concerns or questions.  Follow-up in 3 months with lab work completed prior to visit.    Patient is a type II diabetic currently utilizing 2 or more insulin injections a day to control glucose levels.  He does have recurrent episodes of hypoglycemia likely due to utilizing both insulin and sulfonylureas this is helping control his glucose levels.  Because of these factors, I think be beneficial for him to utilize a continuous glucose monitor to help manage his medications and better control glucose levels and A1c and also prevent further complications from his diabetes.     2. Hyperlipidemia:  Previous lipid panel was excellent.  Continue with current medication.  Will continue monitor over time.    CC: Diabetes    History of Present Illness     HPI:  73 year old male with type 2  diabetes for about 22 years.  He is on oral agents and insulin at home and takes Actos 30 mg daily, Metformin 1000 twice daily, Glimepiride 4 mg twice a day, Lantus 15 units twice a day.  His most recent hemoglobin A1c completed January 5, 2024 was 7.6.  This is an increase, and likely due to inconsistency with medication.  Unfortunately his wife passed away July 2023.   He denies any episodes of hypoglycemia, polyuria, polydipsia, nocturia and blurry vision.  He denies nephropathy and retinopathy but does admit to neuropathy.  Recently diagnosed with a right foot ulcer.  Currently being managed by PCP.  Does still want to have amputation of left foot, but is not scheduled at this time.  He does have concerns with his weight at this time which has continuously been going up.     Issues with hypoglycemia typically before dinner.  Does state that he does not eat lunch.     His most recent diabetic foot exam was performed on January 2021.  Was recommended by podiatrist that he will likely need an amputation.  Not only is he continuing to have pain in his right foot, has been having complications in his right knee and right hip also.  Could be due to change in his gait to compensate for the pain.     Blood Sugar/Glucometer/Pump/CGM review: Fasting blood sugars are typically in the low 100s.  Prior to dinner he can be in the range of  with the occasional outliers.     For his hyperlipidemia, he is treated with Crestor 5 mg daily.  Denies any headaches, vision changes, chest pain, MI or stroke-like symptoms.     Review of Systems   Constitutional:  Negative for activity change, appetite change, fatigue and unexpected weight change.   HENT:  Negative for trouble swallowing.    Eyes:  Negative for visual disturbance.   Respiratory:  Negative for chest tightness and shortness of breath.    Cardiovascular:  Negative for chest pain, palpitations and leg swelling.   Gastrointestinal:  Negative for abdominal pain,  diarrhea, nausea and vomiting.   Endocrine: Negative for cold intolerance, heat intolerance, polydipsia, polyphagia and polyuria.   Genitourinary:  Negative for frequency.   Musculoskeletal:  Positive for arthralgias and gait problem (Utilizing walker).   Skin:  Positive for wound (Left foot and right foot). Negative for rash.   Neurological:  Positive for numbness. Negative for dizziness, weakness, light-headedness and headaches.   Psychiatric/Behavioral:  Positive for dysphoric mood (Wife recently passed away). Negative for sleep disturbance. The patient is not nervous/anxious.        Historical Information   Past Medical History:   Diagnosis Date   • Charcot ankle, left    • Diabetes mellitus (HCC)    • Diabetic neuropathy (HCC)    • GERD (gastroesophageal reflux disease) 12/28/2020   • Hyperlipidemia    • Iron deficiency anemia 12/28/2020   • Personal history of colonic polyps 12/28/2020    Last colonoscopy 2016     Past Surgical History:   Procedure Laterality Date   • ANKLE SURGERY Left    • CARPAL TUNNEL RELEASE     • REPLACEMENT TOTAL KNEE Left    • TOE AMPUTATION Right 02/2023    great toe     Social History   Social History     Substance and Sexual Activity   Alcohol Use Not Currently     Social History     Substance and Sexual Activity   Drug Use Not Currently   • Frequency: 5.0 times per week   • Types: Marijuana     Social History     Tobacco Use   Smoking Status Former   • Current packs/day: 0.75   • Average packs/day: 0.8 packs/day for 40.4 years (30.3 ttl pk-yrs)   • Types: Cigarettes   • Start date: 8/14/1983   Smokeless Tobacco Never     Family History:   Family History   Problem Relation Age of Onset   • Hypertension Mother    • No Known Problems Father    • Diabetes type II Sister    • No Known Problems Brother    • Diabetes type II Sister    • Colon cancer Neg Hx    • Colon polyps Neg Hx        Meds/Allergies   Current Outpatient Medications   Medication Sig Dispense Refill   • CareOne Unifine  "Pentips Plus 31G X 8 MM MISC 2 (two) times a day Use as directed     • clopidogrel (PLAVIX) 75 mg tablet Take 75 mg by mouth daily     • famotidine (PEPCID) 20 mg tablet 2 (two) times a day      • gabapentin (NEURONTIN) 600 MG tablet TAKE ONE TABLET BY MOUTH 3 TIMES DAILY AT 0600, 1400, AND 2200     • glimepiride (AMARYL) 4 mg tablet Take 1 tablet (4 mg total) by mouth in the morning 180 tablet 1   • glucose blood test strip every 24 hours     • Insulin Glargine Solostar (Basaglar KwikPen) 100 UNIT/ML SOPN Inject 0.15 mL (15 Units total) under the skin 2 (two) times a day 15 mL 3   • Lancets (OneTouch Delica Plus [id number]) MISC 3 (three) times a day Test as directed     • Lantus SoloStar 100 units/mL SOPN 15 units twice a day     • metFORMIN (GLUCOPHAGE) 1000 MG tablet Take 1 tablet (1,000 mg total) by mouth 2 (two) times a day with meals 180 tablet 1   • Multiple Vitamin (multivitamin) capsule Take 1 capsule by mouth daily     • pioglitazone (ACTOS) 45 mg tablet Take 1 tablet (45 mg total) by mouth daily (Patient taking differently: Take 30 mg by mouth daily) 90 tablet 1   • pregabalin (LYRICA) 50 mg capsule Take 50 mg by mouth 3 (three) times a day     • rosuvastatin (CRESTOR) 5 mg tablet Take 5 mg by mouth daily     • semaglutide, 0.25 or 0.5 mg/dose, (Ozempic, 0.25 or 0.5 MG/DOSE,) 2 mg/3 mL injection pen Inject 0.375 mL (0.25 mg total) under the skin every 7 days Increase to 0.5 after 4 weeks 3 mL 5   • sildenafil (VIAGRA) 100 mg tablet Take 100 mg by mouth daily as needed       No current facility-administered medications for this visit.     Allergies   Allergen Reactions   • Penicillin G      Other reaction(s): Unknown       Objective   Vitals: Blood pressure 138/72, pulse 66, height 5' 10\" (1.778 m), weight 130 kg (287 lb), SpO2 99%.    Physical Exam  Vitals and nursing note reviewed.   Constitutional:       General: He is not in acute distress.     Appearance: Normal appearance. He is not diaphoretic. "   HENT:      Head: Normocephalic and atraumatic.   Eyes:      General: No scleral icterus.     Extraocular Movements: Extraocular movements intact.      Conjunctiva/sclera: Conjunctivae normal.      Pupils: Pupils are equal, round, and reactive to light.   Cardiovascular:      Rate and Rhythm: Normal rate and regular rhythm.      Heart sounds: No murmur heard.  Pulmonary:      Effort: Pulmonary effort is normal. No respiratory distress.      Breath sounds: Normal breath sounds. No wheezing.   Musculoskeletal:      Cervical back: Normal range of motion.      Right lower leg: No edema.      Left lower leg: No edema.   Lymphadenopathy:      Cervical: No cervical adenopathy.   Skin:     General: Skin is warm and dry.   Neurological:      Mental Status: He is alert and oriented to person, place, and time. Mental status is at baseline.      Sensory: No sensory deficit.      Gait: Gait normal.   Psychiatric:         Mood and Affect: Mood normal.         Behavior: Behavior normal.         Thought Content: Thought content normal.         The history was obtained from the review of the chart, patient.    Lab Results:   Lab Results   Component Value Date/Time    Hemoglobin A1C 7.6 (H) 01/05/2024 11:55 AM    Hemoglobin A1C 7.7 (H) 09/29/2023 12:16 PM    Hemoglobin A1C 6.4 (H) 06/15/2023 11:34 AM    BUN 27 01/05/2024 11:55 AM    BUN 20 09/29/2023 12:16 PM    BUN 18 06/15/2023 11:34 AM    Potassium 5.1 01/05/2024 11:55 AM    Potassium 5.2 09/29/2023 12:16 PM    Potassium 4.7 06/15/2023 11:34 AM    Chloride 104 01/05/2024 11:55 AM    Chloride 109 (H) 09/29/2023 12:16 PM    Chloride 107 (H) 06/15/2023 11:34 AM    CO2 25 01/05/2024 11:55 AM    CO2 26 09/29/2023 12:16 PM    CO2 24 06/15/2023 11:34 AM    Creatinine 1.27 01/05/2024 11:55 AM    Creatinine 1.30 (H) 09/29/2023 12:16 PM    Creatinine 1.17 06/15/2023 11:34 AM    AST 21 01/05/2024 11:55 AM    AST 23 09/29/2023 12:16 PM    AST 27 06/15/2023 11:34 AM    ALT 18 01/05/2024 11:55  "AM    ALT 24 09/29/2023 12:16 PM    ALT 22 06/15/2023 11:34 AM    Protein, Total 6.8 01/05/2024 11:55 AM    Protein, Total 6.6 09/29/2023 12:16 PM    Protein, Total 6.9 06/15/2023 11:34 AM    Albumin 4.3 01/05/2024 11:55 AM    Albumin 4.2 09/29/2023 12:16 PM    Albumin 4.2 06/15/2023 11:34 AM    Globulin, Total 2.5 01/05/2024 11:55 AM    Globulin, Total 2.4 09/29/2023 12:16 PM    Globulin, Total 2.7 06/15/2023 11:34 AM    HDL 45 01/05/2024 11:55 AM    Triglycerides 112 01/05/2024 11:55 AM         Portions of the record may have been created with voice recognition software. Occasional wrong word or \"sound a like\" substitutions may have occurred due to the inherent limitations of voice recognition software. Read the chart carefully and recognize, using context, where substitutions have occurred.    "

## 2024-01-10 NOTE — PATIENT INSTRUCTIONS
Switch from Lantus to Basaglar and continue with 15 units twice a day.    Stop Actos. Start Ozempic 0.25 mg weekly. Increase to 0.5 mg weekly after 4 weeks.    Stop glimepiride 4 mg with breakfast, but continue with 4 mg at dinner.    Continue metformin.    We will work on getting a Dexcom approved.    Call with any concerns or questions.    Follow up in 3 months with lab work prior to visit.

## 2024-01-11 RX ORDER — INSULIN GLARGINE 100 [IU]/ML
15 INJECTION, SOLUTION SUBCUTANEOUS 2 TIMES DAILY
Qty: 15 ML | Refills: 3 | Status: SHIPPED | OUTPATIENT
Start: 2024-01-11

## 2024-01-11 RX ORDER — GLIMEPIRIDE 4 MG/1
4 TABLET ORAL DAILY
Qty: 180 TABLET | Refills: 1 | Status: SHIPPED | OUTPATIENT
Start: 2024-01-11

## 2024-01-17 ENCOUNTER — DOCUMENTATION (OUTPATIENT)
Dept: ENDOCRINOLOGY | Facility: HOSPITAL | Age: 74
End: 2024-01-17

## 2024-01-17 NOTE — PROGRESS NOTES
MONIKA NEERUSALVATORE (Key: [id number])  Rx #: [id number]  Ozempic (0.25 or 0.5 MG/DOSE) 2MG/3ML pen-injectors  Form  [employer] Electronic Prior Authorization Request Form (2017 NCPDP)  Created  6 days ago  Sent to Plan  12 minutes ago  Plan Response  12 minutes ago  Submit Clinical Questions  8 minutes ago  Determination  Favorable  3 minutes ago  Message from Plan  Approved. This drug has been approved under the Member's Medicare Part D benefit. Approved quantity: 3 units per 28 day(s). You may fill up to a 90 day supply except for those on Specialty Tier 5, which can be filled up to a 30 day supply. Please call the pharmacy to process the prescription claim.  *

## 2024-04-06 LAB
ALBUMIN SERPL-MCNC: 4.3 G/DL (ref 3.8–4.8)
ALBUMIN/GLOB SERPL: 1.5 {RATIO} (ref 1.2–2.2)
ALP SERPL-CCNC: 95 IU/L (ref 44–121)
ALT SERPL-CCNC: 25 IU/L (ref 0–44)
AST SERPL-CCNC: 26 IU/L (ref 0–40)
BILIRUB SERPL-MCNC: 0.3 MG/DL (ref 0–1.2)
BUN SERPL-MCNC: 30 MG/DL (ref 8–27)
BUN/CREAT SERPL: 20 (ref 10–24)
CALCIUM SERPL-MCNC: 9.3 MG/DL (ref 8.6–10.2)
CHLORIDE SERPL-SCNC: 105 MMOL/L (ref 96–106)
CO2 SERPL-SCNC: 23 MMOL/L (ref 20–29)
CREAT SERPL-MCNC: 1.48 MG/DL (ref 0.76–1.27)
EGFR: 50 ML/MIN/1.73
EST. AVERAGE GLUCOSE BLD GHB EST-MCNC: 154 MG/DL
GLOBULIN SER-MCNC: 2.8 G/DL (ref 1.5–4.5)
GLUCOSE SERPL-MCNC: 154 MG/DL (ref 70–99)
HBA1C MFR BLD: 7 % (ref 4.8–5.6)
POTASSIUM SERPL-SCNC: 4.8 MMOL/L (ref 3.5–5.2)
PROT SERPL-MCNC: 7.1 G/DL (ref 6–8.5)
SODIUM SERPL-SCNC: 143 MMOL/L (ref 134–144)

## 2024-04-11 ENCOUNTER — OFFICE VISIT (OUTPATIENT)
Dept: ENDOCRINOLOGY | Facility: HOSPITAL | Age: 74
End: 2024-04-11
Payer: MEDICARE

## 2024-04-11 ENCOUNTER — TELEPHONE (OUTPATIENT)
Dept: ENDOCRINOLOGY | Facility: HOSPITAL | Age: 74
End: 2024-04-11

## 2024-04-11 VITALS
OXYGEN SATURATION: 94 % | HEIGHT: 70 IN | BODY MASS INDEX: 40.52 KG/M2 | SYSTOLIC BLOOD PRESSURE: 132 MMHG | WEIGHT: 283 LBS | DIASTOLIC BLOOD PRESSURE: 68 MMHG | HEART RATE: 76 BPM

## 2024-04-11 DIAGNOSIS — E11.42 TYPE 2 DIABETES MELLITUS WITH DIABETIC POLYNEUROPATHY, WITHOUT LONG-TERM CURRENT USE OF INSULIN (HCC): Primary | ICD-10-CM

## 2024-04-11 PROCEDURE — 99214 OFFICE O/P EST MOD 30 MIN: CPT | Performed by: PHYSICIAN ASSISTANT

## 2024-04-11 RX ORDER — INSULIN GLARGINE 100 [IU]/ML
12 INJECTION, SOLUTION SUBCUTANEOUS 2 TIMES DAILY
Qty: 15 ML | Refills: 3 | Status: SHIPPED | OUTPATIENT
Start: 2024-04-11

## 2024-04-11 NOTE — PATIENT INSTRUCTIONS
Decrease Basaglar to 12 units twice a day.     Increase Ozempic to 1 mg weekly.     Continue with Actos, metformin, glimepiride.     We will work on getting a Dexcom approved.  If you do not hear from us in about 2 weeks, please contact the office.     Call with any concerns or questions.     Follow up in 3 months with lab work prior to visit.

## 2024-04-11 NOTE — PROGRESS NOTES
Ayden Sierra 74 y.o. male MRN: [id number]    Encounter: [id number]      Assessment/Plan     Assessment:  This is a 74 y.o.-year-old male with type 2 diabetes and hyperlipidemia.    Plan:  1. Type 2 diabetes: Most recent hemoglobin A1c was 7.0.  Appears to be doing well on Ozempic, but I do have some slight concerns with lower blood sugars.  At this time he is interested in increasing his Ozempic to 1 mg weekly.  I asked him to decrease his Basaglar to 12 units twice a day.  Continue with metformin 1000 mg twice a day, glimepiride 4 mg with dinner, and pioglitazone 30 mg daily.  Continue checking blood sugars twice a day.  Contact the office with any concerns or questions.  Is interested in utilizing a Dexcom.  Contact the office with any concerns or questions.  Follow-up in 3 months with lab work completed prior to visit.     Patient is a type II diabetic currently utilizing 2 or more insulin injections a day to control glucose levels.  He does have recurrent episodes of hypoglycemia likely due to utilizing both insulin and sulfonylureas this is helping control his glucose levels.  Because of these factors, I think be beneficial for him to utilize a continuous glucose monitor to help manage his medications and better control glucose levels and A1c and also prevent further complications from his diabetes.     2. Hyperlipidemia:  Previous lipid panel was excellent.  Continue with current medication.  Will continue monitor over time.    CC: Type 2 diabetes follow-up    History of Present Illness     HPI:  74 year old male with type 2 diabetes for about 23 years.  He is on oral agents and insulin at home and takes Actos 30 mg daily, Metformin 1000 twice daily, Glimepiride 4 mg daily, Ozempic 0.5 mg weekly, Lantus 15 units twice a day.  His most recent hemoglobin A1c completed April 5, 2024 was 7.0.  Unfortunately his wife passed away July 2023.   He denies any episodes of hypoglycemia, polyuria, polydipsia, nocturia  and blurry vision.  He denies nephropathy and retinopathy but does admit to neuropathy.  Recently diagnosed with a right foot ulcer.  Currently being managed by PCP.  Does still want to have amputation of left foot, but is not scheduled at this time.  Is looking to get new diabetic shoes, but needs to have a foot exam completed by an MD.     No symptoms of hypoglycemia.  Review of blood sugar logs does show he has frequent episodes of glucose levels in the 70s or slightly less than 70.     His most recent diabetic foot exam was performed on January 2021.  Was recommended by podiatrist that he will likely need an amputation.  Not only is he continuing to have pain in his right foot, has been having complications in his right knee and right hip also.  Could be due to change in his gait to compensate for the pain.     Blood Sugar/Glucometer/Pump/CGM review: Checking blood sugar twice a day.  Fasting blood sugars typically between  with a few outliers.  Prior to dinner he is typically between 70 and 120 with a few outliers.     For his hyperlipidemia, he is treated with Crestor 5 mg daily.  Denies any headaches, vision changes, chest pain, MI or stroke-like symptoms.     Review of Systems   Constitutional:  Negative for activity change, appetite change, fatigue and unexpected weight change.   HENT:  Negative for trouble swallowing.    Eyes:  Negative for visual disturbance.   Respiratory:  Negative for chest tightness and shortness of breath.    Cardiovascular:  Negative for chest pain, palpitations and leg swelling.   Gastrointestinal:  Negative for abdominal pain, diarrhea, nausea and vomiting.   Endocrine: Negative for cold intolerance, heat intolerance, polydipsia, polyphagia and polyuria.   Genitourinary:  Negative for frequency.   Musculoskeletal:  Positive for arthralgias and gait problem (Utilizing walker).   Skin:  Positive for wound (Left foot and right foot). Negative for rash.   Neurological:  Positive  for numbness. Negative for dizziness, weakness, light-headedness and headaches.   Psychiatric/Behavioral:  Positive for dysphoric mood (Wife recently passed away). Negative for sleep disturbance. The patient is not nervous/anxious.        Historical Information   Past Medical History:   Diagnosis Date   • Charcot ankle, left    • Diabetes mellitus (HCC)    • Diabetic neuropathy (HCC)    • GERD (gastroesophageal reflux disease) 12/28/2020   • Hyperlipidemia    • Iron deficiency anemia 12/28/2020   • Personal history of colonic polyps 12/28/2020    Last colonoscopy 2016     Past Surgical History:   Procedure Laterality Date   • ANKLE SURGERY Left    • CARPAL TUNNEL RELEASE     • REPLACEMENT TOTAL KNEE Left    • TOE AMPUTATION Right 02/2023    great toe     Social History   Social History     Substance and Sexual Activity   Alcohol Use Not Currently     Social History     Substance and Sexual Activity   Drug Use Not Currently   • Frequency: 5.0 times per week   • Types: Marijuana     Social History     Tobacco Use   Smoking Status Former   • Current packs/day: 0.75   • Average packs/day: 0.7 packs/day for 40.7 years (30.5 ttl pk-yrs)   • Types: Cigarettes   • Start date: 8/14/1983   Smokeless Tobacco Never     Family History:   Family History   Problem Relation Age of Onset   • Hypertension Mother    • No Known Problems Father    • Diabetes type II Sister    • No Known Problems Brother    • Diabetes type II Sister    • Colon cancer Neg Hx    • Colon polyps Neg Hx        Meds/Allergies   Current Outpatient Medications   Medication Sig Dispense Refill   • [employer] Unifine Pentips Plus 31G X 8 MM MISC 2 (two) times a day Use as directed     • clopidogrel (PLAVIX) 75 mg tablet Take 75 mg by mouth daily     • famotidine (PEPCID) 20 mg tablet 2 (two) times a day      • gabapentin (NEURONTIN) 600 MG tablet TAKE ONE TABLET BY MOUTH 3 TIMES DAILY AT 0600, 1400, AND 2200     • glimepiride (AMARYL) 4 mg tablet Take 1 tablet (4 mg  "total) by mouth in the morning 180 tablet 1   • glucose blood test strip every 24 hours     • Insulin Glargine Solostar (Basaglar KwikPen) 100 UNIT/ML SOPN Inject 0.12 mL (12 Units total) under the skin 2 (two) times a day 15 mL 3   • Lancets (OneTouch Delica Plus [id number]) MISC 3 (three) times a day Test as directed     • metFORMIN (GLUCOPHAGE) 1000 MG tablet Take 1 tablet (1,000 mg total) by mouth 2 (two) times a day with meals 180 tablet 1   • Multiple Vitamin (multivitamin) capsule Take 1 capsule by mouth daily     • pioglitazone (ACTOS) 45 mg tablet Take 1 tablet (45 mg total) by mouth daily (Patient taking differently: Take 30 mg by mouth daily) 90 tablet 1   • pregabalin (LYRICA) 50 mg capsule Take 50 mg by mouth 3 (three) times a day     • rosuvastatin (CRESTOR) 5 mg tablet Take 5 mg by mouth daily     • semaglutide, 1 mg/dose, (Ozempic, 1 MG/DOSE,) 4 mg/3 mL injection pen Inject 0.75 mL (1 mg total) under the skin every 7 days 3 mL 5   • sildenafil (VIAGRA) 100 mg tablet Take 100 mg by mouth daily as needed       No current facility-administered medications for this visit.     Allergies   Allergen Reactions   • Penicillin G      Other reaction(s): Unknown       Objective   Vitals: Blood pressure 132/68, pulse 76, height 5' 10\" (1.778 m), weight 128 kg (283 lb), SpO2 94%.    Physical Exam  Vitals and nursing note reviewed.   Constitutional:       General: He is not in acute distress.     Appearance: Normal appearance. He is not diaphoretic.   HENT:      Head: Normocephalic and atraumatic.   Eyes:      General: No scleral icterus.     Extraocular Movements: Extraocular movements intact.      Conjunctiva/sclera: Conjunctivae normal.      Pupils: Pupils are equal, round, and reactive to light.   Cardiovascular:      Rate and Rhythm: Normal rate and regular rhythm.      Heart sounds: No murmur heard.  Pulmonary:      Effort: Pulmonary effort is normal. No respiratory distress.      Breath sounds: Normal breath " sounds. No wheezing.   Musculoskeletal:      Cervical back: Normal range of motion.      Right lower leg: No edema.      Left lower leg: No edema.   Lymphadenopathy:      Cervical: No cervical adenopathy.   Skin:     General: Skin is warm and dry.   Neurological:      Mental Status: He is alert and oriented to person, place, and time. Mental status is at baseline.      Sensory: No sensory deficit.      Gait: Gait normal.   Psychiatric:         Mood and Affect: Mood normal.         Behavior: Behavior normal.         Thought Content: Thought content normal.         The history was obtained from the review of the chart, patient.    Lab Results:   Lab Results   Component Value Date/Time    Hemoglobin A1C 7.0 (H) 04/05/2024 11:21 AM    Hemoglobin A1C 7.6 (H) 01/05/2024 11:55 AM    Hemoglobin A1C 7.7 (H) 09/29/2023 12:16 PM    BUN 30 (H) 04/05/2024 11:21 AM    BUN 27 01/05/2024 11:55 AM    BUN 20 09/29/2023 12:16 PM    Potassium 4.8 04/05/2024 11:21 AM    Potassium 5.1 01/05/2024 11:55 AM    Potassium 5.2 09/29/2023 12:16 PM    Chloride 105 04/05/2024 11:21 AM    Chloride 104 01/05/2024 11:55 AM    Chloride 109 (H) 09/29/2023 12:16 PM    CO2 23 04/05/2024 11:21 AM    CO2 25 01/05/2024 11:55 AM    CO2 26 09/29/2023 12:16 PM    Creatinine 1.48 (H) 04/05/2024 11:21 AM    Creatinine 1.27 01/05/2024 11:55 AM    Creatinine 1.30 (H) 09/29/2023 12:16 PM    AST 26 04/05/2024 11:21 AM    AST 21 01/05/2024 11:55 AM    AST 23 09/29/2023 12:16 PM    ALT 25 04/05/2024 11:21 AM    ALT 18 01/05/2024 11:55 AM    ALT 24 09/29/2023 12:16 PM    Protein, Total 7.1 04/05/2024 11:21 AM    Protein, Total 6.8 01/05/2024 11:55 AM    Protein, Total 6.6 09/29/2023 12:16 PM    Albumin 4.3 04/05/2024 11:21 AM    Albumin 4.3 01/05/2024 11:55 AM    Albumin 4.2 09/29/2023 12:16 PM    Globulin, Total 2.8 04/05/2024 11:21 AM    Globulin, Total 2.5 01/05/2024 11:55 AM    Globulin, Total 2.4 09/29/2023 12:16 PM    HDL 45 01/05/2024 11:55 AM    Triglycerides  "112 01/05/2024 11:55 AM         Portions of the record may have been created with voice recognition software. Occasional wrong word or \"sound a like\" substitutions may have occurred due to the inherent limitations of voice recognition software. Read the chart carefully and recognize, using context, where substitutions have occurred.    "

## 2024-04-12 LAB
DME PARACHUTE DELIVERY DATE REQUESTED: NORMAL
DME PARACHUTE ITEM DESCRIPTION: NORMAL
DME PARACHUTE ITEM DESCRIPTION: NORMAL
DME PARACHUTE ORDER STATUS: NORMAL
DME PARACHUTE SUPPLIER NAME: NORMAL
DME PARACHUTE SUPPLIER PHONE: NORMAL

## 2024-04-18 ENCOUNTER — OFFICE VISIT (OUTPATIENT)
Dept: ENDOCRINOLOGY | Facility: HOSPITAL | Age: 74
End: 2024-04-18
Payer: MEDICARE

## 2024-04-18 ENCOUNTER — TELEPHONE (OUTPATIENT)
Age: 74
End: 2024-04-18

## 2024-04-18 VITALS
HEART RATE: 72 BPM | OXYGEN SATURATION: 95 % | WEIGHT: 283 LBS | DIASTOLIC BLOOD PRESSURE: 68 MMHG | BODY MASS INDEX: 40.52 KG/M2 | SYSTOLIC BLOOD PRESSURE: 132 MMHG | HEIGHT: 70 IN

## 2024-04-18 DIAGNOSIS — E11.42 TYPE 2 DIABETES MELLITUS WITH DIABETIC POLYNEUROPATHY, WITHOUT LONG-TERM CURRENT USE OF INSULIN (HCC): Primary | ICD-10-CM

## 2024-04-18 PROCEDURE — 99213 OFFICE O/P EST LOW 20 MIN: CPT | Performed by: INTERNAL MEDICINE

## 2024-04-18 NOTE — TELEPHONE ENCOUNTER
PA for Ozempic 1 mg not required     Reason  Prior Authorization Not Required              Message sent to provider pool yes

## 2024-04-18 NOTE — PROGRESS NOTES
4/19/2024    Assessment/Plan      Diagnoses and all orders for this visit:    Type 2 diabetes mellitus with diabetic polyneuropathy, without long-term current use of insulin (HCC)        Assessment/Plan:  Type 2 diabetes with neuropathy.  Foot exam performed in the office today.  Will be following up with podiatry for diabetic shoes.  Continue current diabetes regimen as recommended.    Will follow-up as planned with blood work.      CC: Type 2 diabetes with neuropathy follow-up    History of Present Illness     HPI: Ayden Sierra is a 74 y.o. year old male with history of type 2 diabetes here for diabetic foot exam in order to get diabetic shoes.    Review of Systems was not performed as this visit was strictly for diabetic foot exam.    Historical Information   Past Medical History:   Diagnosis Date    Charcot ankle, left     Diabetes mellitus (HCC)     Diabetic neuropathy (HCC)     GERD (gastroesophageal reflux disease) 12/28/2020    Hyperlipidemia     Iron deficiency anemia 12/28/2020    Personal history of colonic polyps 12/28/2020    Last colonoscopy 2016     Past Surgical History:   Procedure Laterality Date    ANKLE SURGERY Left     CARPAL TUNNEL RELEASE      REPLACEMENT TOTAL KNEE Left     TOE AMPUTATION Right 02/2023    great toe     Social History   Social History     Substance and Sexual Activity   Alcohol Use Not Currently     Social History     Substance and Sexual Activity   Drug Use Not Currently    Frequency: 5.0 times per week    Types: Marijuana     Social History     Tobacco Use   Smoking Status Former    Current packs/day: 0.75    Average packs/day: 0.7 packs/day for 40.7 years (30.5 ttl pk-yrs)    Types: Cigarettes    Start date: 8/14/1983   Smokeless Tobacco Never     Family History:   Family History   Problem Relation Age of Onset    Hypertension Mother     No Known Problems Father     Diabetes type II Sister     No Known Problems Brother     Diabetes type II Sister     Colon cancer Neg Hx   "   Colon polyps Neg Hx        Meds/Allergies   Current Outpatient Medications   Medication Sig Dispense Refill    [employer] Unifine Pentips Plus 31G X 8 MM MISC 2 (two) times a day Use as directed      clopidogrel (PLAVIX) 75 mg tablet Take 75 mg by mouth daily      famotidine (PEPCID) 20 mg tablet 2 (two) times a day       gabapentin (NEURONTIN) 600 MG tablet TAKE ONE TABLET BY MOUTH 3 TIMES DAILY AT 0600, 1400, AND 2200      glimepiride (AMARYL) 4 mg tablet Take 1 tablet (4 mg total) by mouth in the morning 180 tablet 1    glucose blood test strip every 24 hours      Insulin Glargine Solostar (Basaglar KwikPen) 100 UNIT/ML SOPN Inject 0.12 mL (12 Units total) under the skin 2 (two) times a day 15 mL 3    Lancets (OneTouch Delica Plus [id number]) MISC 3 (three) times a day Test as directed      metFORMIN (GLUCOPHAGE) 1000 MG tablet Take 1 tablet (1,000 mg total) by mouth 2 (two) times a day with meals 180 tablet 1    Multiple Vitamin (multivitamin) capsule Take 1 capsule by mouth daily      pioglitazone (ACTOS) 45 mg tablet Take 1 tablet (45 mg total) by mouth daily (Patient taking differently: Take 30 mg by mouth daily) 90 tablet 1    pregabalin (LYRICA) 50 mg capsule Take 50 mg by mouth 3 (three) times a day      rosuvastatin (CRESTOR) 5 mg tablet Take 5 mg by mouth daily      semaglutide, 1 mg/dose, (Ozempic, 1 MG/DOSE,) 4 mg/3 mL injection pen Inject 0.75 mL (1 mg total) under the skin every 7 days 3 mL 5    sildenafil (VIAGRA) 100 mg tablet Take 100 mg by mouth daily as needed       No current facility-administered medications for this visit.     Allergies   Allergen Reactions    Penicillin G      Other reaction(s): Unknown       Objective   Vitals: Blood pressure 132/68, pulse 72, height 5' 10\" (1.778 m), weight 128 kg (283 lb), SpO2 95%.  Invasive Devices       None                   Physical Exam  Cardiovascular:      Pulses: Pulses are weak.           Dorsalis pedis pulses are 1+ on the right side and 1+ on " the left side.        Posterior tibial pulses are 0 on the right side and 0 on the left side.      Comments: 1+ dorsalis pedis pulses bilaterally.  Nonpalpable posterior tibialis pulses bilaterally.  Musculoskeletal:         General: Deformity present.      Right lower leg: Edema present.      Left lower leg: Edema present.      Comments: 1-2+ right and 3+ left lower extremity edema. Anterior right shin excoriation. Absent right 1st toe and left 2nd toe. Charcot foot deformity with lateral curve of left foot at ankle.  No ulcerations of the feet.   Feet:      Right foot: amputated     Skin integrity: No ulcer, skin breakdown, erythema, warmth, callus or dry skin.      Left foot: amputated     Skin integrity: No ulcer, skin breakdown, erythema, warmth, callus or dry skin.   Neurological:      Comments: Vibration sensation absent from the first MP joint on the right to the medial malleolus as first toe DIP joint was not evaluated since he had an absent right first toe.  Vibration sensation on the left absent from the first toe DIP joint to the medial malleolus. Monofilament sensation absent to both feet.     Patient's shoes and socks were not removed.    Right Foot/Ankle   Right Foot Inspection  Skin Exam: skin normal and skin intact. No dry skin, no warmth, no callus, no erythema, no maceration, no abnormal color, no pre-ulcer, no ulcer and no callus. Amputation: amputation right foot (Comments: Right first toe amputated)    Toe Exam: swelling.  no right toe deformity    Sensory   Vibration: absent  Monofilament testing: absent    Vascular  Capillary refills: elevated  The right DP pulse is 1+. The right PT pulse is 0.     Left Foot/Ankle  Left Foot Inspection  Skin Exam: skin normal and skin intact. No dry skin, no warmth, no erythema, no maceration, normal color, no pre-ulcer, no ulcer and no callus. Amputation: amputation left foot (Comments: Left second toe amputated)    Toe Exam: swelling and left toe deformity.      Sensory   Vibration: absent  Monofilament testing: absent    Vascular  Capillary refills: elevated  The left DP pulse is 1+. The left PT pulse is 0.     Assign Risk Category  Deformity present  Loss of protective sensation  Weak pulses  Risk: 3        The history was obtained from the review of the chart and from the patient.    Lab Results:          Lab Results   Component Value Date    CREATININE 1.48 (H) 04/05/2024    CREATININE 1.27 01/05/2024    CREATININE 1.30 (H) 09/29/2023    BUN 30 (H) 04/05/2024    K 4.8 04/05/2024     04/05/2024    CO2 23 04/05/2024     eGFR   Date Value Ref Range Status   04/05/2024 50 (L) >59 mL/min/1.73 Final         Lab Results   Component Value Date    HDL 45 01/05/2024    TRIG 112 01/05/2024    CHOLHDL 2.7 01/05/2024       Lab Results   Component Value Date    ALT 25 04/05/2024    AST 26 04/05/2024       Lab Results   Component Value Date    TSH 2.820 01/05/2024             Future Appointments   Date Time Provider Department Center   7/17/2024  2:20 PM Ayden Flores PA-C [facility]

## 2024-07-11 LAB
ALBUMIN SERPL-MCNC: 4.2 G/DL (ref 3.8–4.8)
ALP SERPL-CCNC: 92 IU/L (ref 44–121)
ALT SERPL-CCNC: 28 IU/L (ref 0–44)
AST SERPL-CCNC: 27 IU/L (ref 0–40)
BILIRUB SERPL-MCNC: 0.3 MG/DL (ref 0–1.2)
BUN SERPL-MCNC: 22 MG/DL (ref 8–27)
BUN/CREAT SERPL: 17 (ref 10–24)
CALCIUM SERPL-MCNC: 9.3 MG/DL (ref 8.6–10.2)
CHLORIDE SERPL-SCNC: 105 MMOL/L (ref 96–106)
CO2 SERPL-SCNC: 27 MMOL/L (ref 20–29)
CREAT SERPL-MCNC: 1.29 MG/DL (ref 0.76–1.27)
EGFR: 59 ML/MIN/1.73
EST. AVERAGE GLUCOSE BLD GHB EST-MCNC: 171 MG/DL
GLOBULIN SER-MCNC: 2.4 G/DL (ref 1.5–4.5)
GLUCOSE SERPL-MCNC: 181 MG/DL (ref 70–99)
HBA1C MFR BLD: 7.6 % (ref 4.8–5.6)
POTASSIUM SERPL-SCNC: 4.7 MMOL/L (ref 3.5–5.2)
PROT SERPL-MCNC: 6.6 G/DL (ref 6–8.5)
SODIUM SERPL-SCNC: 142 MMOL/L (ref 134–144)

## 2024-07-17 ENCOUNTER — OFFICE VISIT (OUTPATIENT)
Dept: ENDOCRINOLOGY | Facility: HOSPITAL | Age: 74
End: 2024-07-17
Payer: MEDICARE

## 2024-07-17 VITALS
BODY MASS INDEX: 40.23 KG/M2 | HEIGHT: 70 IN | HEART RATE: 74 BPM | WEIGHT: 281 LBS | OXYGEN SATURATION: 96 % | SYSTOLIC BLOOD PRESSURE: 138 MMHG | DIASTOLIC BLOOD PRESSURE: 66 MMHG

## 2024-07-17 DIAGNOSIS — E11.42 TYPE 2 DIABETES MELLITUS WITH DIABETIC POLYNEUROPATHY, WITHOUT LONG-TERM CURRENT USE OF INSULIN (HCC): Primary | ICD-10-CM

## 2024-07-17 PROCEDURE — 99214 OFFICE O/P EST MOD 30 MIN: CPT | Performed by: PHYSICIAN ASSISTANT

## 2024-07-17 RX ORDER — INSULIN GLARGINE 100 [IU]/ML
15 INJECTION, SOLUTION SUBCUTANEOUS 2 TIMES DAILY
COMMUNITY

## 2024-07-17 NOTE — PROGRESS NOTES
Ayden Sierra 74 y.o. male MRN: [id number]    Encounter: [id number]      Assessment & Plan     Assessment:  This is a 74 y.o.-year-old male with type 2 diabetes with hyperlipidemia.    Plan:  1. Type 2 diabetes: Most recent hemoglobin A1c has increased to 7.6.  Has stopped taking the Ozempic due to cost of medication and has restarted his Lantus 15 units twice a day.  He will continue with metformin 1000 mg twice a day, pioglitazone 30 mg daily, and glimepiride 4 mg with dinner.  We tried to get him a Dexcom, but there is seems to have been some loss of communication.  He will contact insurance company to see if we can get more information and see if we can get him utilizing a Dexcom.  Continue with lifestyle changes to help improve glucose levels.  Please contact the office if there is any concerns or questions in the meantime.  Follow-up in 3 months with lab work completed prior to visit.     Patient is a type II diabetic currently utilizing 2 or more insulin injections a day to control glucose levels.  He does have recurrent episodes of hypoglycemia likely due to utilizing both insulin and sulfonylureas this is helping control his glucose levels.  Because of these factors, I think be beneficial for him to utilize a continuous glucose monitor to help manage his medications and better control glucose levels and A1c and also prevent further complications from his diabetes.     2. Hyperlipidemia:  Previous lipid panel was excellent.  Continue with current medication.  Will continue monitor over time.    CC: Type 2 diabetes follow-up    History of Present Illness     HPI:  74 year old male with type 2 diabetes for about 23 years.  He is on oral agents and insulin at home and takes Actos 30 mg daily, Metformin 1000 twice daily, Glimepiride 4 mg daily, Lantus 15 units twice a day.  His most recent hemoglobin A1c completed July 10, 2024 was 7.6.  Is no longer utilizing Ozempic due to cost of medication.  Also did not  notice any weight loss while taking it.  Unfortunately his wife passed away July 2023.   He denies any episodes of hypoglycemia, polyuria, polydipsia, nocturia and blurry vision.  He denies nephropathy and retinopathy but does admit to neuropathy.  Recently diagnosed with a right foot ulcer.  Currently being managed by PCP.  Does still want to have amputation of left foot, but is not scheduled at this time.     No symptoms of hypoglycemia.  Review of blood sugar logs does show he has frequent episodes of glucose levels in the 70s or slightly less than 70.     His most recent diabetic foot exam was performed April 2024.  Was recommended by podiatrist that he will likely need an amputation.  Not only is he continuing to have pain in his right foot, has been having complications in his right knee and right hip also.  Could be due to change in his gait to compensate for the pain.     Blood Sugar/Glucometer/Pump/CGM review: Has not been checking glucose levels recently due to neuropathy in bilateral hands.     For his hyperlipidemia, he is treated with Crestor 5 mg daily.  Denies any headaches, vision changes, chest pain, MI or stroke-like symptoms.     Review of Systems   Constitutional:  Negative for activity change, appetite change, fatigue and unexpected weight change.   HENT:  Negative for trouble swallowing.    Eyes:  Negative for visual disturbance.   Respiratory:  Negative for chest tightness and shortness of breath.    Cardiovascular:  Negative for chest pain, palpitations and leg swelling.   Gastrointestinal:  Negative for abdominal pain, diarrhea, nausea and vomiting.   Endocrine: Negative for cold intolerance, heat intolerance, polydipsia, polyphagia and polyuria.   Genitourinary:  Negative for frequency.   Musculoskeletal:  Positive for arthralgias and gait problem (Utilizing cane).   Skin:  Positive for wound (Left foot and right foot). Negative for rash.   Neurological:  Positive for numbness. Negative for  dizziness, weakness, light-headedness and headaches.   Psychiatric/Behavioral:  Positive for dysphoric mood (Wife recently passed away). Negative for sleep disturbance. The patient is not nervous/anxious.        Historical Information   Past Medical History:   Diagnosis Date   • Charcot ankle, left    • Diabetes mellitus (HCC)    • Diabetic neuropathy (HCC)    • GERD (gastroesophageal reflux disease) 12/28/2020   • Hyperlipidemia    • Iron deficiency anemia 12/28/2020   • Personal history of colonic polyps 12/28/2020    Last colonoscopy 2016     Past Surgical History:   Procedure Laterality Date   • ANKLE SURGERY Left    • CARPAL TUNNEL RELEASE     • REPLACEMENT TOTAL KNEE Left    • TOE AMPUTATION Right 02/2023    great toe     Social History   Social History     Substance and Sexual Activity   Alcohol Use Not Currently     Social History     Substance and Sexual Activity   Drug Use Not Currently   • Frequency: 5.0 times per week   • Types: Marijuana     Social History     Tobacco Use   Smoking Status Former   • Current packs/day: 0.75   • Average packs/day: 0.7 packs/day for 40.9 years (30.7 ttl pk-yrs)   • Types: Cigarettes   • Start date: 8/14/1983   Smokeless Tobacco Never     Family History:   Family History   Problem Relation Age of Onset   • Hypertension Mother    • No Known Problems Father    • Diabetes type II Sister    • No Known Problems Brother    • Diabetes type II Sister    • Colon cancer Neg Hx    • Colon polyps Neg Hx        Meds/Allergies   Current Outpatient Medications   Medication Sig Dispense Refill   • [employer] Unifine Pentips Plus 31G X 8 MM MISC 2 (two) times a day Use as directed     • clopidogrel (PLAVIX) 75 mg tablet Take 75 mg by mouth daily     • famotidine (PEPCID) 20 mg tablet 2 (two) times a day      • gabapentin (NEURONTIN) 600 MG tablet TAKE ONE TABLET BY MOUTH 3 TIMES DAILY AT 0600, 1400, AND 2200     • glimepiride (AMARYL) 4 mg tablet Take 1 tablet (4 mg total) by mouth in the  "morning 180 tablet 1   • glucose blood test strip every 24 hours     • Insulin Glargine Solostar (Lantus SoloStar) 100 UNIT/ML SOPN Inject 15 Units under the skin 2 (two) times a day     • Lancets (OneTouch Delica Plus [id number]) MISC 3 (three) times a day Test as directed     • metFORMIN (GLUCOPHAGE) 1000 MG tablet Take 1 tablet (1,000 mg total) by mouth 2 (two) times a day with meals 180 tablet 1   • Multiple Vitamin (multivitamin) capsule Take 1 capsule by mouth daily     • patient supplied medication Medical Marijuana     • pioglitazone (ACTOS) 45 mg tablet Take 1 tablet (45 mg total) by mouth daily 90 tablet 1   • pregabalin (LYRICA) 50 mg capsule Take 50 mg by mouth 3 (three) times a day     • rosuvastatin (CRESTOR) 5 mg tablet Take 5 mg by mouth daily     • sildenafil (VIAGRA) 100 mg tablet Take 100 mg by mouth daily as needed     • Insulin Glargine Solostar (Basaglar KwikPen) 100 UNIT/ML SOPN Inject 0.12 mL (12 Units total) under the skin 2 (two) times a day (Patient not taking: Reported on 7/17/2024) 15 mL 3   • semaglutide, 1 mg/dose, (Ozempic, 1 MG/DOSE,) 4 mg/3 mL injection pen Inject 0.75 mL (1 mg total) under the skin every 7 days (Patient not taking: Reported on 7/17/2024) 3 mL 5     No current facility-administered medications for this visit.     Allergies   Allergen Reactions   • Penicillin G      Other reaction(s): Unknown       Objective   Vitals: Blood pressure 138/66, pulse 74, height 5' 10\" (1.778 m), weight 127 kg (281 lb), SpO2 96%.    Physical Exam  Vitals and nursing note reviewed.   Constitutional:       General: He is not in acute distress.     Appearance: Normal appearance. He is not diaphoretic.   HENT:      Head: Normocephalic and atraumatic.   Eyes:      General: No scleral icterus.     Extraocular Movements: Extraocular movements intact.      Conjunctiva/sclera: Conjunctivae normal.      Pupils: Pupils are equal, round, and reactive to light.   Cardiovascular:      Rate and Rhythm: " Normal rate and regular rhythm.      Heart sounds: No murmur heard.  Pulmonary:      Effort: Pulmonary effort is normal. No respiratory distress.      Breath sounds: Normal breath sounds. No wheezing.   Musculoskeletal:      Right lower leg: No edema.      Left lower leg: No edema.   Lymphadenopathy:      Cervical: No cervical adenopathy.   Skin:     General: Skin is warm and dry.   Neurological:      Mental Status: He is alert and oriented to person, place, and time. Mental status is at baseline.      Sensory: No sensory deficit.      Gait: Gait normal.   Psychiatric:         Mood and Affect: Mood normal.         Behavior: Behavior normal.         Thought Content: Thought content normal.         The history was obtained from the review of the chart, patient.    Lab Results:   Lab Results   Component Value Date/Time    Hemoglobin A1C 7.6 (H) 07/10/2024 10:40 AM    Hemoglobin A1C 7.0 (H) 04/05/2024 11:21 AM    Hemoglobin A1C 7.6 (H) 01/05/2024 11:55 AM    BUN 22 07/10/2024 10:40 AM    BUN 30 (H) 04/05/2024 11:21 AM    BUN 27 01/05/2024 11:55 AM    Potassium 4.7 07/10/2024 10:40 AM    Potassium 4.8 04/05/2024 11:21 AM    Potassium 5.1 01/05/2024 11:55 AM    Chloride 105 07/10/2024 10:40 AM    Chloride 105 04/05/2024 11:21 AM    Chloride 104 01/05/2024 11:55 AM    CO2 27 07/10/2024 10:40 AM    CO2 23 04/05/2024 11:21 AM    CO2 25 01/05/2024 11:55 AM    Creatinine 1.29 (H) 07/10/2024 10:40 AM    Creatinine 1.48 (H) 04/05/2024 11:21 AM    Creatinine 1.27 01/05/2024 11:55 AM    AST 27 07/10/2024 10:40 AM    AST 26 04/05/2024 11:21 AM    AST 21 01/05/2024 11:55 AM    ALT 28 07/10/2024 10:40 AM    ALT 25 04/05/2024 11:21 AM    ALT 18 01/05/2024 11:55 AM    Protein, Total 6.6 07/10/2024 10:40 AM    Protein, Total 7.1 04/05/2024 11:21 AM    Protein, Total 6.8 01/05/2024 11:55 AM    Albumin 4.2 07/10/2024 10:40 AM    Albumin 4.3 04/05/2024 11:21 AM    Albumin 4.3 01/05/2024 11:55 AM    Globulin, Total 2.4 07/10/2024 10:40 AM  "   Globulin, Total 2.8 04/05/2024 11:21 AM    Globulin, Total 2.5 01/05/2024 11:55 AM    HDL 45 01/05/2024 11:55 AM    Triglycerides 112 01/05/2024 11:55 AM       Portions of the record may have been created with voice recognition software. Occasional wrong word or \"sound a like\" substitutions may have occurred due to the inherent limitations of voice recognition software. Read the chart carefully and recognize, using context, where substitutions have occurred.    "

## 2024-07-17 NOTE — PATIENT INSTRUCTIONS
Take Basaglar 15 units twice a day.     Stop Ozempic.     Continue with Actos, metformin, glimepiride.     We will work on getting a Dexcom approved.  If you do not hear from us in about 2 weeks, please contact the office.     Call with any concerns or questions.     Follow up in 3 months with lab work prior to visit.

## 2024-10-08 ENCOUNTER — DOCUMENTATION (OUTPATIENT)
Dept: ENDOCRINOLOGY | Facility: HOSPITAL | Age: 74
End: 2024-10-08

## 2024-10-11 LAB
ALBUMIN SERPL-MCNC: 4.1 G/DL (ref 3.8–4.8)
ALP SERPL-CCNC: 93 IU/L (ref 44–121)
ALT SERPL-CCNC: 23 IU/L (ref 0–44)
AST SERPL-CCNC: 22 IU/L (ref 0–40)
BILIRUB SERPL-MCNC: 0.2 MG/DL (ref 0–1.2)
BUN SERPL-MCNC: 22 MG/DL (ref 8–27)
BUN/CREAT SERPL: 17 (ref 10–24)
CALCIUM SERPL-MCNC: 9.2 MG/DL (ref 8.6–10.2)
CHLORIDE SERPL-SCNC: 105 MMOL/L (ref 96–106)
CO2 SERPL-SCNC: 23 MMOL/L (ref 20–29)
CREAT SERPL-MCNC: 1.31 MG/DL (ref 0.76–1.27)
EGFR: 58 ML/MIN/1.73
EST. AVERAGE GLUCOSE BLD GHB EST-MCNC: 223 MG/DL
GLOBULIN SER-MCNC: 2.3 G/DL (ref 1.5–4.5)
GLUCOSE SERPL-MCNC: 238 MG/DL (ref 70–99)
HBA1C MFR BLD: 9.4 % (ref 4.8–5.6)
POTASSIUM SERPL-SCNC: 5 MMOL/L (ref 3.5–5.2)
PROT SERPL-MCNC: 6.4 G/DL (ref 6–8.5)
SODIUM SERPL-SCNC: 142 MMOL/L (ref 134–144)

## 2024-10-17 ENCOUNTER — OFFICE VISIT (OUTPATIENT)
Dept: ENDOCRINOLOGY | Facility: HOSPITAL | Age: 74
End: 2024-10-17
Payer: MEDICARE

## 2024-10-17 VITALS
DIASTOLIC BLOOD PRESSURE: 70 MMHG | HEART RATE: 68 BPM | HEIGHT: 70 IN | WEIGHT: 271 LBS | BODY MASS INDEX: 38.8 KG/M2 | SYSTOLIC BLOOD PRESSURE: 140 MMHG | OXYGEN SATURATION: 97 %

## 2024-10-17 DIAGNOSIS — E11.42 TYPE 2 DIABETES MELLITUS WITH DIABETIC POLYNEUROPATHY, WITHOUT LONG-TERM CURRENT USE OF INSULIN (HCC): ICD-10-CM

## 2024-10-17 PROCEDURE — 99214 OFFICE O/P EST MOD 30 MIN: CPT | Performed by: PHYSICIAN ASSISTANT

## 2024-10-17 RX ORDER — INSULIN GLARGINE 100 [IU]/ML
18 INJECTION, SOLUTION SUBCUTANEOUS 2 TIMES DAILY
Qty: 15 ML | Refills: 3 | Status: SHIPPED | OUTPATIENT
Start: 2024-10-17

## 2024-10-17 RX ORDER — GLIMEPIRIDE 4 MG/1
4 TABLET ORAL 2 TIMES DAILY
Qty: 180 TABLET | Refills: 3 | Status: SHIPPED | OUTPATIENT
Start: 2024-10-17

## 2024-10-17 NOTE — PATIENT INSTRUCTIONS
Increase Basaglar 18 units twice a day.     Continue with Actos, metformin.    Increase glimepiride to 4 mg twice a day.     We will work on getting a Dexcom approved.  If you do not hear from us in about 2 weeks, please contact the office.     Call with any concerns or questions.     Follow up in 3 months with lab work prior to visit.

## 2024-10-17 NOTE — PROGRESS NOTES
Ayden Sierra 74 y.o. male MRN: [id number]    Encounter: [id number]      Assessment & Plan     Assessment:  This is a 74 y.o.-year-old male with type 2 diabetes with hyperlipidemia.    Plan:  1. Type 2 diabetes: Most recent hemoglobin A1c came back at 9.4.  Unfortunately there is concerns with missed medications likely contributing to his higher A1c.  He also has yet to get a Dexcom to monitor his glucose levels.  Otherwise is doing well today.  At this time would like to increase his glimepiride to 4 mg twice a day.  He will also increase his Basaglar to 18 units twice a day.  Continue with metformin 1000 mg twice a day and pioglitazone 45 mg daily.  Once again given the paperwork for Dexcom.  He will contact the office with any concerns or questions.  Follow-up in 3 months with labwork completed prior to visit.     Patient is a type II diabetic currently utilizing 2 or more insulin injections a day to control glucose levels.  He does have recurrent episodes of hypoglycemia likely due to utilizing both insulin and sulfonylureas this is helping control his glucose levels.  Because of these factors, I think be beneficial for him to utilize a continuous glucose monitor to help manage his medications and better control glucose levels and A1c and also prevent further complications from his diabetes.     2. Hyperlipidemia:  Previous lipid panel was excellent.  Continue with current medication.  Repeat lipid panel prior to next office visit.    CC: Type 2 diabetes follow-up    History of Present Illness     HPI:  74 year old male with type 2 diabetes for about 23 years.  He is on oral agents and insulin at home and takes Actos 45 mg daily, Metformin 1000 twice daily, Glimepiride 4 mg daily, Lantus 15 units twice a day.  His most recent hemoglobin A1c completed October 10, 2024 was 9.4.  Is no longer utilizing Ozempic due to cost of medication.  Also did not notice any weight loss while taking it.  He denies any episodes  of hypoglycemia, polyuria, polydipsia, nocturia and blurry vision.  He denies nephropathy and retinopathy but does admit to neuropathy.  Recently diagnosed with a right foot ulcer.  Currently being managed by PCP.  Since he is living by himself now, he does not think he is going to have the amputation of his left foot as he would likely need to be placed in a nursing home.  Otherwise he is doing well today.  Does admit to missing some of his medications which may be a contributing factor into the higher A1c.  On a positive note states that he has given up sweet tea.     No symptoms of hypoglycemia.  Does not routinely check blood sugar due to neuropathy.     His most recent diabetic foot exam was performed April 2024.  Was recommended by podiatrist that he will likely need an amputation.  Not only is he continuing to have pain in his right foot, has been having complications in his right knee and right hip also.  Could be due to change in his gait to compensate for the pain.     Blood Sugar/Glucometer/Pump/CGM review: Has not been checking glucose levels recently due to neuropathy in bilateral hands.     For his hyperlipidemia, he is treated with Crestor 5 mg daily.  Denies any headaches, vision changes, chest pain, MI or stroke-like symptoms.     Review of Systems   Constitutional:  Negative for activity change, appetite change, fatigue and unexpected weight change.   HENT:  Negative for trouble swallowing.    Eyes:  Negative for visual disturbance.   Respiratory:  Negative for chest tightness and shortness of breath.    Cardiovascular:  Negative for chest pain, palpitations and leg swelling.   Gastrointestinal:  Negative for abdominal pain, diarrhea, nausea and vomiting.   Endocrine: Negative for cold intolerance, heat intolerance, polydipsia, polyphagia and polyuria.   Genitourinary:  Negative for frequency.   Musculoskeletal:  Positive for arthralgias and gait problem (Utilizing cane).   Skin:  Positive for  wound (Left foot and right foot). Negative for rash.   Neurological:  Positive for numbness. Negative for dizziness, weakness, light-headedness and headaches.   Psychiatric/Behavioral:  Positive for dysphoric mood (Wife recently passed away). Negative for sleep disturbance. The patient is not nervous/anxious.        Historical Information   Past Medical History:   Diagnosis Date    Charcot ankle, left     Diabetes mellitus (HCC)     Diabetic neuropathy (HCC)     GERD (gastroesophageal reflux disease) 12/28/2020    Hyperlipidemia     Iron deficiency anemia 12/28/2020    Personal history of colonic polyps 12/28/2020    Last colonoscopy 2016     Past Surgical History:   Procedure Laterality Date    ANKLE SURGERY Left     CARPAL TUNNEL RELEASE      REPLACEMENT TOTAL KNEE Left     TOE AMPUTATION Right 02/2023    great toe     Social History   Social History     Substance and Sexual Activity   Alcohol Use Not Currently     Social History     Substance and Sexual Activity   Drug Use Not Currently    Frequency: 5.0 times per week    Types: Marijuana     Social History     Tobacco Use   Smoking Status Former    Current packs/day: 0.75    Average packs/day: 0.8 packs/day for 41.2 years (30.9 ttl pk-yrs)    Types: Cigarettes    Start date: 8/14/1983   Smokeless Tobacco Never     Family History:   Family History   Problem Relation Age of Onset    Hypertension Mother     No Known Problems Father     Diabetes type II Sister     No Known Problems Brother     Diabetes type II Sister     Colon cancer Neg Hx     Colon polyps Neg Hx        Meds/Allergies   Current Outpatient Medications   Medication Sig Dispense Refill    clopidogrel (PLAVIX) 75 mg tablet Take 75 mg by mouth daily      famotidine (PEPCID) 20 mg tablet 2 (two) times a day       gabapentin (NEURONTIN) 600 MG tablet TAKE ONE TABLET BY MOUTH 3 TIMES DAILY AT 0600, 1400, AND 2200      glimepiride (AMARYL) 4 mg tablet Take 1 tablet (4 mg total) by mouth in the morning 180  "tablet 1    glucose blood test strip every 24 hours      [employer] Unifine Pentips Plus 31G X 8 MM MISC 2 (two) times a day Use as directed      Insulin Glargine Solostar (Basaglar KwikPen) 100 UNIT/ML SOPN Inject 0.12 mL (12 Units total) under the skin 2 (two) times a day (Patient not taking: Reported on 7/17/2024) 15 mL 3    Insulin Glargine Solostar (Lantus SoloStar) 100 UNIT/ML SOPN Inject 15 Units under the skin 2 (two) times a day      Lancets (OneTouch Delica Plus [id number]) MISC 3 (three) times a day Test as directed      metFORMIN (GLUCOPHAGE) 1000 MG tablet Take 1 tablet (1,000 mg total) by mouth 2 (two) times a day with meals 180 tablet 1    Multiple Vitamin (multivitamin) capsule Take 1 capsule by mouth daily      patient supplied medication Medical Marijuana      pioglitazone (ACTOS) 45 mg tablet Take 1 tablet (45 mg total) by mouth daily 90 tablet 1    pregabalin (LYRICA) 50 mg capsule Take 50 mg by mouth 3 (three) times a day      rosuvastatin (CRESTOR) 5 mg tablet Take 5 mg by mouth daily      semaglutide, 1 mg/dose, (Ozempic, 1 MG/DOSE,) 4 mg/3 mL injection pen Inject 0.75 mL (1 mg total) under the skin every 7 days (Patient not taking: Reported on 7/17/2024) 3 mL 5    sildenafil (VIAGRA) 100 mg tablet Take 100 mg by mouth daily as needed       No current facility-administered medications for this visit.     Allergies   Allergen Reactions    Penicillin G      Other reaction(s): Unknown       Objective   Vitals: Height 5' 10\" (1.778 m), weight 123 kg (271 lb).    Physical Exam  Vitals and nursing note reviewed.   Constitutional:       General: He is not in acute distress.     Appearance: Normal appearance. He is not diaphoretic.   HENT:      Head: Normocephalic and atraumatic.   Eyes:      General: No scleral icterus.     Extraocular Movements: Extraocular movements intact.      Conjunctiva/sclera: Conjunctivae normal.      Pupils: Pupils are equal, round, and reactive to light.   Cardiovascular:      " Rate and Rhythm: Normal rate and regular rhythm.      Heart sounds: No murmur heard.  Pulmonary:      Effort: Pulmonary effort is normal. No respiratory distress.      Breath sounds: Normal breath sounds. No wheezing.   Musculoskeletal:      Cervical back: Normal range of motion.      Right lower leg: Edema present.      Left lower leg: Edema present.   Lymphadenopathy:      Cervical: No cervical adenopathy.   Skin:     General: Skin is warm and dry.   Neurological:      Mental Status: He is alert and oriented to person, place, and time. Mental status is at baseline.      Sensory: No sensory deficit.      Gait: Gait normal.   Psychiatric:         Mood and Affect: Mood normal.         Behavior: Behavior normal.         Thought Content: Thought content normal.         The history was obtained from the review of the chart, patient.    Lab Results:   Lab Results   Component Value Date/Time    Hemoglobin A1C 9.4 (H) 10/10/2024 11:37 AM    Hemoglobin A1C 7.6 (H) 07/10/2024 10:40 AM    Hemoglobin A1C 7.0 (H) 04/05/2024 11:21 AM    BUN 22 10/10/2024 11:37 AM    BUN 22 07/10/2024 10:40 AM    BUN 30 (H) 04/05/2024 11:21 AM    Potassium 5.0 10/10/2024 11:37 AM    Potassium 4.7 07/10/2024 10:40 AM    Potassium 4.8 04/05/2024 11:21 AM    Chloride 105 10/10/2024 11:37 AM    Chloride 105 07/10/2024 10:40 AM    Chloride 105 04/05/2024 11:21 AM    CO2 23 10/10/2024 11:37 AM    CO2 27 07/10/2024 10:40 AM    CO2 23 04/05/2024 11:21 AM    Creatinine 1.31 (H) 10/10/2024 11:37 AM    Creatinine 1.29 (H) 07/10/2024 10:40 AM    Creatinine 1.48 (H) 04/05/2024 11:21 AM    AST 22 10/10/2024 11:37 AM    AST 27 07/10/2024 10:40 AM    AST 26 04/05/2024 11:21 AM    ALT 23 10/10/2024 11:37 AM    ALT 28 07/10/2024 10:40 AM    ALT 25 04/05/2024 11:21 AM    Protein, Total 6.4 10/10/2024 11:37 AM    Protein, Total 6.6 07/10/2024 10:40 AM    Protein, Total 7.1 04/05/2024 11:21 AM    Albumin 4.1 10/10/2024 11:37 AM    Albumin 4.2 07/10/2024 10:40 AM     "Albumin 4.3 04/05/2024 11:21 AM    Globulin, Total 2.3 10/10/2024 11:37 AM    Globulin, Total 2.4 07/10/2024 10:40 AM    Globulin, Total 2.8 04/05/2024 11:21 AM    HDL 45 01/05/2024 11:55 AM    Triglycerides 112 01/05/2024 11:55 AM           Imaging Studies: Results Review Statement: No pertinent imaging studies reviewed.    Portions of the record may have been created with voice recognition software. Occasional wrong word or \"sound a like\" substitutions may have occurred due to the inherent limitations of voice recognition software. Read the chart carefully and recognize, using context, where substitutions have occurred.    "

## 2024-10-30 ENCOUNTER — TELEPHONE (OUTPATIENT)
Age: 74
End: 2024-10-30

## 2024-10-30 NOTE — TELEPHONE ENCOUNTER
Received a call from Filemon with [employer] phone [phone] ext [phone] that she faxed over a new form today (10-30-24) to have it signed and send recent office note and rest of the instructions. She stated it was faxed around 8:23 am and if we can keep a lookout for it and complete it and send back.

## 2024-10-31 NOTE — TELEPHONE ENCOUNTER
Received fax. Last foot exam was in April which now makes him due again. Paperwork given to Joi to schedule patient with Dr. Hensley

## 2024-11-05 ENCOUNTER — OFFICE VISIT (OUTPATIENT)
Dept: ENDOCRINOLOGY | Facility: HOSPITAL | Age: 74
End: 2024-11-05
Payer: MEDICARE

## 2024-11-05 DIAGNOSIS — L03.116 CELLULITIS OF LEFT LOWER EXTREMITY: ICD-10-CM

## 2024-11-05 DIAGNOSIS — N18.31 STAGE 3A CHRONIC KIDNEY DISEASE (HCC): ICD-10-CM

## 2024-11-05 DIAGNOSIS — E11.42 TYPE 2 DIABETES MELLITUS WITH DIABETIC POLYNEUROPATHY, WITHOUT LONG-TERM CURRENT USE OF INSULIN (HCC): Primary | ICD-10-CM

## 2024-11-05 PROBLEM — L03.90 CELLULITIS: Status: ACTIVE | Noted: 2024-11-05

## 2024-11-05 PROCEDURE — 99213 OFFICE O/P EST LOW 20 MIN: CPT | Performed by: INTERNAL MEDICINE

## 2024-11-05 RX ORDER — CEPHALEXIN 500 MG/1
500 CAPSULE ORAL EVERY 12 HOURS SCHEDULED
Qty: 20 CAPSULE | Refills: 0 | Status: SHIPPED | OUTPATIENT
Start: 2024-11-05 | End: 2024-11-15

## 2024-11-05 NOTE — PATIENT INSTRUCTIONS
I ordered Keflex 500 mg twice a day for 10 days.     If the right leg is no better in a few days, follow up with Dr. Mcgraw or the podiatrist.     Follow up as scheduled jan 2025.

## 2024-11-05 NOTE — PROGRESS NOTES
11/5/2024    Assessment & Plan      Diagnoses and all orders for this visit:    Type 2 diabetes mellitus with diabetic polyneuropathy, without long-term current use of insulin (HCC)    Cellulitis of left lower extremity  -     cephalexin (KEFLEX) 500 mg capsule; Take 1 capsule (500 mg total) by mouth every 12 (twelve) hours for 10 days    Stage 3a chronic kidney disease (HCC)        Assessment/Plan:  1.  Type 2 diabetes.  Medications will be continued as he has follow-up scheduled with repeat blood work and medications were just adjusted.    2.  Diabetic neuropathy.  Diabetic foot exam was performed in the office today.    3.  Cellulitis.  He appears to have cellulitis of the right leg post excoriation of his skin as he has significant increase in warmth and erythema of that left shin.  I have prescribed Keflex 500 mg twice a day for 10 days.  I have asked him to contact his primary care physician or podiatrist if his symptoms do not improve within the next few days.    Follow-up will be January 2025 as planned with blood work.      CC: Diabetes type II follow-up    History of Present Illness     HPI: Ayden Sierra is a 74 y.o. year old male with type 2 diabetes for  23  years.  He is on oral agents and insulin at home and takes pride 4 mg twice a day, Basaglar insulin 18 units daily, metformin 1000 mg twice daily.      He is here for diabetic foot exam.       Review of Systems    Historical Information   Past Medical History:   Diagnosis Date    Charcot ankle, left     Diabetes mellitus (HCC)     Diabetic neuropathy (HCC)     GERD (gastroesophageal reflux disease) 12/28/2020    Hyperlipidemia     Iron deficiency anemia 12/28/2020    Personal history of colonic polyps 12/28/2020    Last colonoscopy 2016     Past Surgical History:   Procedure Laterality Date    ANKLE SURGERY Left     CARPAL TUNNEL RELEASE      REPLACEMENT TOTAL KNEE Left     TOE AMPUTATION Right 02/2023    great toe     Social History   Social  History     Substance and Sexual Activity   Alcohol Use Not Currently     Social History     Substance and Sexual Activity   Drug Use Not Currently    Frequency: 5.0 times per week    Types: Marijuana     Social History     Tobacco Use   Smoking Status Former    Current packs/day: 0.75    Average packs/day: 0.8 packs/day for 41.2 years (30.9 ttl pk-yrs)    Types: Cigarettes    Start date: 8/14/1983   Smokeless Tobacco Never     Family History:   Family History   Problem Relation Age of Onset    Hypertension Mother     No Known Problems Father     Diabetes type II Sister     No Known Problems Brother     Diabetes type II Sister     Colon cancer Neg Hx     Colon polyps Neg Hx        Meds/Allergies   Current Outpatient Medications   Medication Sig Dispense Refill    [employer] Unifine Pentips Plus 31G X 8 MM MISC 2 (two) times a day Use as directed      cephalexin (KEFLEX) 500 mg capsule Take 1 capsule (500 mg total) by mouth every 12 (twelve) hours for 10 days 20 capsule 0    clopidogrel (PLAVIX) 75 mg tablet Take 75 mg by mouth daily      famotidine (PEPCID) 20 mg tablet 2 (two) times a day       gabapentin (NEURONTIN) 600 MG tablet TAKE ONE TABLET BY MOUTH 3 TIMES DAILY AT 0600, 1400, AND 2200      glimepiride (AMARYL) 4 mg tablet Take 1 tablet (4 mg total) by mouth 2 (two) times a day 180 tablet 3    Insulin Glargine Solostar (Basaglar KwikPen) 100 UNIT/ML SOPN Inject 0.18 mL (18 Units total) under the skin 2 (two) times a day 15 mL 3    metFORMIN (GLUCOPHAGE) 1000 MG tablet Take 1 tablet (1,000 mg total) by mouth 2 (two) times a day with meals 180 tablet 1    Multiple Vitamin (multivitamin) capsule Take 1 capsule by mouth daily      patient supplied medication Medical Marijuana      pregabalin (LYRICA) 50 mg capsule Take 50 mg by mouth 3 (three) times a day      rosuvastatin (CRESTOR) 5 mg tablet Take 5 mg by mouth daily      glucose blood test strip every 24 hours      Lancets (OneTouch Delica Plus [id number]) MISC 3  (three) times a day Test as directed      pioglitazone (ACTOS) 45 mg tablet Take 1 tablet (45 mg total) by mouth daily 90 tablet 1    semaglutide, 1 mg/dose, (Ozempic, 1 MG/DOSE,) 4 mg/3 mL injection pen Inject 0.75 mL (1 mg total) under the skin every 7 days (Patient not taking: Reported on 7/17/2024) 3 mL 5    sildenafil (VIAGRA) 100 mg tablet Take 100 mg by mouth daily as needed       No current facility-administered medications for this visit.     Allergies   Allergen Reactions    Penicillin G      Other reaction(s): Unknown       Objective   Vitals: There were no vitals taken for this visit.  Invasive Devices       None                   Physical Exam  Cardiovascular:      Pulses: Pulses are weak.           Dorsalis pedis pulses are 1+ on the right side and 1+ on the left side.        Posterior tibial pulses are 1+ on the right side and 1+ on the left side.      Comments: 1+ Urbina pedis and posterior tibialis pulses bilaterally.  Musculoskeletal:         General: Deformity present.      Right lower leg: Edema present.      Left lower leg: Edema present.      Comments: Amputation right 1st toe and left 2nd toe. Left foot charcot deformity with lateral curvature. 2+ bilateral lower extremity edema.   Feet:      Right foot: amputated     Skin integrity: Erythema present. No ulcer, skin breakdown, warmth, callus or dry skin.      Left foot: amputated     Skin integrity: No ulcer, skin breakdown, erythema, warmth, callus or dry skin.   Skin:     General: Skin is warm.      Findings: Erythema present.      Comments: Erythematous skin with warmth right anterior shin. Blood blister tip of left 1st toe and dorsum left 3rd toe. Thick skin on heels, and 1st MP bilaterally with some callus formation. Skin tear anterior right shin about 5 cm.    Neurological:      Comments: Vibration sensation absent from the first MP joint to the medial malleolus on the right and from the first toe DIP joint to the medial malleolus on the  left. Monofilament sensation absent bilaterally except to the right arch for which she has a slight sensation of monofilament.     Patient's shoes and socks removed.    Right Foot/Ankle   Right Foot Inspection  Skin Exam: skin normal, skin intact and erythema. No dry skin, no warmth, no callus, no maceration, no abnormal color, no pre-ulcer, no ulcer and no callus. Amputation: amputation right foot (Comments: right 1st toe)    Toe Exam: swelling and right toe deformity.     Sensory   Vibration: absent  Monofilament testing: absent    Vascular  The right DP pulse is 1+. The right PT pulse is 1+.     Left Foot/Ankle  Left Foot Inspection  Skin Exam: skin normal and skin intact. No dry skin, no warmth, no erythema, no maceration, normal color, no pre-ulcer, no ulcer and no callus. Amputation: amputation left foot (Comments: left 2nd toe)    Toe Exam: swelling and left toe deformity.     Sensory   Vibration: absent  Monofilament testing: absent    Vascular  The left DP pulse is 1+. The left PT pulse is 1+.     Assign Risk Category  Deformity present  Loss of protective sensation  Weak pulses  Risk: 3        The history was obtained from the review of the chart and from the patient.    Lab Results:    Most recent Alc is  Lab Results   Component Value Date    HGBA1C 9.4 (H) 10/10/2024               Lab Results   Component Value Date    CREATININE 1.31 (H) 10/10/2024    CREATININE 1.29 (H) 07/10/2024    CREATININE 1.48 (H) 04/05/2024    BUN 22 10/10/2024    K 5.0 10/10/2024     10/10/2024    CO2 23 10/10/2024     eGFR   Date Value Ref Range Status   10/10/2024 58 (L) >59 mL/min/1.73 Final         Lab Results   Component Value Date    HDL 45 01/05/2024    TRIG 112 01/05/2024    CHOLHDL 2.7 01/05/2024       Lab Results   Component Value Date    ALT 23 10/10/2024    AST 22 10/10/2024       Lab Results   Component Value Date    TSH 2.820 01/05/2024             Future Appointments   Date Time Provider Department Center  "  1/28/2025  1:20 PM Ayden Flores PA-C [facility]       Portions of the record may have been created with voice recognition software. Occasional wrong word or \"sound a like\" substitutions may have occurred due to the inherent limitations of voice recognition software. Read the chart carefully and recognize, using context, where substitutions have occurred.  "

## 2024-12-02 ENCOUNTER — DOCUMENTATION (OUTPATIENT)
Dept: ENDOCRINOLOGY | Facility: HOSPITAL | Age: 74
End: 2024-12-02

## 2025-01-25 DIAGNOSIS — E11.42 TYPE 2 DIABETES MELLITUS WITH DIABETIC POLYNEUROPATHY, WITHOUT LONG-TERM CURRENT USE OF INSULIN (HCC): ICD-10-CM

## 2025-01-25 LAB
ALBUMIN SERPL-MCNC: 4.3 G/DL (ref 3.8–4.8)
ALBUMIN/CREAT UR: 8 MG/G CREAT (ref 0–29)
ALP SERPL-CCNC: 92 IU/L (ref 44–121)
ALT SERPL-CCNC: 30 IU/L (ref 0–44)
AST SERPL-CCNC: 34 IU/L (ref 0–40)
BILIRUB SERPL-MCNC: 0.5 MG/DL (ref 0–1.2)
BUN SERPL-MCNC: 26 MG/DL (ref 8–27)
BUN/CREAT SERPL: 19 (ref 10–24)
CALCIUM SERPL-MCNC: 9.7 MG/DL (ref 8.6–10.2)
CHLORIDE SERPL-SCNC: 105 MMOL/L (ref 96–106)
CHOLEST SERPL-MCNC: 110 MG/DL (ref 100–199)
CHOLEST/HDLC SERPL: 2.9 RATIO (ref 0–5)
CO2 SERPL-SCNC: 21 MMOL/L (ref 20–29)
CREAT SERPL-MCNC: 1.34 MG/DL (ref 0.76–1.27)
CREAT UR-MCNC: 132.1 MG/DL
EGFR: 56 ML/MIN/1.73
EST. AVERAGE GLUCOSE BLD GHB EST-MCNC: 206 MG/DL
GLOBULIN SER-MCNC: 2.2 G/DL (ref 1.5–4.5)
GLUCOSE SERPL-MCNC: 140 MG/DL (ref 70–99)
HBA1C MFR BLD: 8.8 % (ref 4.8–5.6)
HDLC SERPL-MCNC: 38 MG/DL
LDLC SERPL CALC-MCNC: 50 MG/DL (ref 0–99)
MICROALBUMIN UR-MCNC: 10.6 UG/ML
POTASSIUM SERPL-SCNC: 5.1 MMOL/L (ref 3.5–5.2)
PROT SERPL-MCNC: 6.5 G/DL (ref 6–8.5)
SL AMB VLDL CHOLESTEROL CALC: 22 MG/DL (ref 5–40)
SODIUM SERPL-SCNC: 143 MMOL/L (ref 134–144)
TRIGL SERPL-MCNC: 124 MG/DL (ref 0–149)
TSH SERPL DL<=0.005 MIU/L-ACNC: 1.65 UIU/ML (ref 0.45–4.5)

## 2025-01-27 ENCOUNTER — RESULTS FOLLOW-UP (OUTPATIENT)
Dept: ENDOCRINOLOGY | Facility: HOSPITAL | Age: 75
End: 2025-01-27

## 2025-01-27 RX ORDER — INSULIN GLARGINE 100 [IU]/ML
INJECTION, SOLUTION SUBCUTANEOUS
Qty: 15 ML | Refills: 1 | Status: SHIPPED | OUTPATIENT
Start: 2025-01-27 | End: 2025-01-28 | Stop reason: SDUPTHER

## 2025-01-28 ENCOUNTER — OFFICE VISIT (OUTPATIENT)
Dept: ENDOCRINOLOGY | Facility: HOSPITAL | Age: 75
End: 2025-01-28
Payer: MEDICARE

## 2025-01-28 VITALS
HEIGHT: 70 IN | WEIGHT: 273 LBS | DIASTOLIC BLOOD PRESSURE: 80 MMHG | BODY MASS INDEX: 39.08 KG/M2 | SYSTOLIC BLOOD PRESSURE: 124 MMHG | HEART RATE: 80 BPM

## 2025-01-28 DIAGNOSIS — Z89.422 ACQUIRED ABSENCE OF OTHER LEFT TOE(S) (HCC): ICD-10-CM

## 2025-01-28 DIAGNOSIS — E11.42 TYPE 2 DIABETES MELLITUS WITH DIABETIC POLYNEUROPATHY, WITH LONG-TERM CURRENT USE OF INSULIN (HCC): Primary | ICD-10-CM

## 2025-01-28 DIAGNOSIS — E66.01 OBESITY, MORBID (HCC): ICD-10-CM

## 2025-01-28 DIAGNOSIS — N18.31 STAGE 3A CHRONIC KIDNEY DISEASE (HCC): ICD-10-CM

## 2025-01-28 DIAGNOSIS — Z79.4 TYPE 2 DIABETES MELLITUS WITH DIABETIC POLYNEUROPATHY, WITH LONG-TERM CURRENT USE OF INSULIN (HCC): Primary | ICD-10-CM

## 2025-01-28 PROCEDURE — 99214 OFFICE O/P EST MOD 30 MIN: CPT | Performed by: PHYSICIAN ASSISTANT

## 2025-01-28 RX ORDER — INSULIN GLARGINE 100 [IU]/ML
INJECTION, SOLUTION SUBCUTANEOUS
Qty: 30 ML | Refills: 3 | Status: SHIPPED | OUTPATIENT
Start: 2025-01-28

## 2025-01-28 NOTE — PROGRESS NOTES
Ayden Sierra 74 y.o. male MRN: [id number]    Encounter: [id number]      Assessment & Plan     Assessment:  This is a 74 y.o.-year-old male with type 2 diabetes with hyperlipidemia.    Plan:  1. Type 2 diabetes: Recent hemoglobin A1c 8.8.  This has improved, but he is still above goal.  No blood sugar logs, but just recently got a freestyle marialuisa 3.  I did put in referral for diabetes education to help him set this up.  In the meantime since he does not appear to be having any episodes of hypoglycemia will increase his Basaglar to 20 units twice a day.  He will continue with metformin 1000 mg twice a day and pioglitazone 45 mg daily.  He will contact the office with any concerns or questions.  Follow-up in 3 months with labwork completed prior to visit.     Patient is a type II diabetic currently utilizing 2 or more insulin injections a day to control glucose levels.  He does have recurrent episodes of hypoglycemia likely due to utilizing both insulin and sulfonylureas this is helping control his glucose levels.  Because of these factors, I think be beneficial for him to utilize a continuous glucose monitor to help manage his medications and better control glucose levels and A1c and also prevent further complications from his diabetes.     2. Hyperlipidemia:  Previous lipid panel was excellent.  Continue with current medication.  Repeat lipid panel prior to next office visit.    CC: Type 2 diabetes follow-up    History of Present Illness     HPI:  74 year old male with type 2 diabetes for about 23 years.  He is on oral agents and insulin at home and takes Actos 45 mg daily, Metformin 1000 twice daily, Glimepiride 4 mg daily, Lantus 15 units twice a day.  His most recent hemoglobin A1c completed October 10, 2024 was 9.4.  Is no longer utilizing Ozempic due to cost of medication.  Also did not notice any weight loss while taking it.  He denies any episodes of hypoglycemia, polyuria, polydipsia, nocturia and blurry  vision.  He denies nephropathy and retinopathy but does admit to neuropathy.  Recently diagnosed with a right foot ulcer.  Currently being managed by PCP.  Since he is living by himself now, he does not think he is going to have the amputation of his left foot as he would likely need to be placed in a nursing home.  Otherwise he is doing well today.  Has freestyle Tolu 3 on hand and needs assistance to set it up.     No symptoms of hypoglycemia.  Does not routinely check blood sugar due to neuropathy.     His most recent diabetic foot exam was performed April 2024.  Was recommended by podiatrist that he will likely need an amputation.  Not only is he continuing to have pain in his right foot, has been having complications in his right knee and right hip also.  Could be due to change in his gait to compensate for the pain.     Blood Sugar/Glucometer/Pump/CGM review: Has not been checking glucose levels recently due to neuropathy in bilateral hands.     For his hyperlipidemia, he is treated with Crestor 5 mg daily.  Denies any headaches, vision changes, chest pain, MI or stroke-like symptoms.     Review of Systems   Constitutional:  Negative for activity change, appetite change, fatigue and unexpected weight change.   HENT:  Negative for trouble swallowing.    Eyes:  Negative for visual disturbance.   Respiratory:  Negative for chest tightness and shortness of breath.    Cardiovascular:  Negative for chest pain, palpitations and leg swelling.   Gastrointestinal:  Negative for abdominal pain, diarrhea, nausea and vomiting.   Endocrine: Negative for cold intolerance, heat intolerance, polydipsia, polyphagia and polyuria.   Genitourinary:  Negative for frequency.   Musculoskeletal:  Positive for arthralgias and gait problem (Utilizing cane).   Skin:  Positive for wound (Left foot and right foot). Negative for rash.   Neurological:  Positive for numbness. Negative for dizziness, weakness, light-headedness and headaches.    Psychiatric/Behavioral:  Negative for dysphoric mood and sleep disturbance. The patient is not nervous/anxious.        Historical Information   Past Medical History:   Diagnosis Date   • Charcot ankle, left    • Diabetes mellitus (HCC)    • Diabetic neuropathy (HCC)    • GERD (gastroesophageal reflux disease) 12/28/2020   • Hyperlipidemia    • Iron deficiency anemia 12/28/2020   • Personal history of colonic polyps 12/28/2020    Last colonoscopy 2016     Past Surgical History:   Procedure Laterality Date   • ANKLE SURGERY Left    • CARPAL TUNNEL RELEASE     • REPLACEMENT TOTAL KNEE Left    • TOE AMPUTATION Right 02/2023    great toe     Social History   Social History     Substance and Sexual Activity   Alcohol Use Not Currently     Social History     Substance and Sexual Activity   Drug Use Not Currently   • Frequency: 5.0 times per week   • Types: Marijuana     Social History     Tobacco Use   Smoking Status Former   • Current packs/day: 0.75   • Average packs/day: 0.7 packs/day for 41.5 years (31.1 ttl pk-yrs)   • Types: Cigarettes   • Start date: 8/14/1983   Smokeless Tobacco Never     Family History:   Family History   Problem Relation Age of Onset   • Hypertension Mother    • No Known Problems Father    • Diabetes type II Sister    • No Known Problems Brother    • Diabetes type II Sister    • Colon cancer Neg Hx    • Colon polyps Neg Hx        Meds/Allergies   Current Outpatient Medications   Medication Sig Dispense Refill   • [employer] Unifine Pentips Plus 31G X 8 MM MISC 2 (two) times a day Use as directed     • clopidogrel (PLAVIX) 75 mg tablet Take 75 mg by mouth daily     • famotidine (PEPCID) 20 mg tablet 2 (two) times a day      • gabapentin (NEURONTIN) 600 MG tablet TAKE ONE TABLET BY MOUTH 3 TIMES DAILY AT 0600, 1400, AND 2200     • glimepiride (AMARYL) 4 mg tablet Take 1 tablet (4 mg total) by mouth 2 (two) times a day 180 tablet 3   • Insulin Glargine Solostar (Basaglar KwikPen) 100 UNIT/ML SOPN 20  "units twice daily 30 mL 3   • metFORMIN (GLUCOPHAGE) 1000 MG tablet Take 1 tablet (1,000 mg total) by mouth 2 (two) times a day with meals 180 tablet 1   • Multiple Vitamin (multivitamin) capsule Take 1 capsule by mouth daily     • patient supplied medication Medical Marijuana     • pregabalin (LYRICA) 50 mg capsule Take 50 mg by mouth 3 (three) times a day     • rosuvastatin (CRESTOR) 5 mg tablet Take 5 mg by mouth daily       No current facility-administered medications for this visit.     Allergies   Allergen Reactions   • Penicillin G      Other reaction(s): Unknown       Objective   Vitals: Blood pressure 124/80, pulse 80, height 5' 10\" (1.778 m), weight 124 kg (273 lb).    Physical Exam  Vitals and nursing note reviewed.   Constitutional:       General: He is not in acute distress.     Appearance: Normal appearance. He is not diaphoretic.   HENT:      Head: Normocephalic and atraumatic.   Eyes:      General: No scleral icterus.     Extraocular Movements: Extraocular movements intact.      Conjunctiva/sclera: Conjunctivae normal.      Pupils: Pupils are equal, round, and reactive to light.   Cardiovascular:      Rate and Rhythm: Normal rate and regular rhythm.      Heart sounds: No murmur heard.  Pulmonary:      Effort: Pulmonary effort is normal. No respiratory distress.      Breath sounds: Normal breath sounds. No wheezing.   Musculoskeletal:      Cervical back: Normal range of motion.      Right lower leg: No edema.      Left lower leg: No edema.   Lymphadenopathy:      Cervical: No cervical adenopathy.   Skin:     General: Skin is warm and dry.   Neurological:      Mental Status: He is alert and oriented to person, place, and time. Mental status is at baseline.      Sensory: No sensory deficit.      Gait: Gait normal.   Psychiatric:         Mood and Affect: Mood normal.         Behavior: Behavior normal.         Thought Content: Thought content normal.         The history was obtained from the review of the " "chart, patient.    Lab Results:   Lab Results   Component Value Date/Time    Hemoglobin A1C 8.8 (H) 01/24/2025 09:59 AM    Hemoglobin A1C 9.4 (H) 10/10/2024 11:37 AM    Hemoglobin A1C 7.6 (H) 07/10/2024 10:40 AM    BUN 26 01/24/2025 09:59 AM    BUN 22 10/10/2024 11:37 AM    BUN 22 07/10/2024 10:40 AM    Potassium 5.1 01/24/2025 09:59 AM    Potassium 5.0 10/10/2024 11:37 AM    Potassium 4.7 07/10/2024 10:40 AM    Chloride 105 01/24/2025 09:59 AM    Chloride 105 10/10/2024 11:37 AM    Chloride 105 07/10/2024 10:40 AM    CO2 21 01/24/2025 09:59 AM    CO2 23 10/10/2024 11:37 AM    CO2 27 07/10/2024 10:40 AM    Creatinine 1.34 (H) 01/24/2025 09:59 AM    Creatinine 1.31 (H) 10/10/2024 11:37 AM    Creatinine 1.29 (H) 07/10/2024 10:40 AM    AST 34 01/24/2025 09:59 AM    AST 22 10/10/2024 11:37 AM    AST 27 07/10/2024 10:40 AM    ALT 30 01/24/2025 09:59 AM    ALT 23 10/10/2024 11:37 AM    ALT 28 07/10/2024 10:40 AM    Protein, Total 6.5 01/24/2025 09:59 AM    Protein, Total 6.4 10/10/2024 11:37 AM    Protein, Total 6.6 07/10/2024 10:40 AM    Albumin 4.3 01/24/2025 09:59 AM    Albumin 4.1 10/10/2024 11:37 AM    Albumin 4.2 07/10/2024 10:40 AM    Globulin, Total 2.2 01/24/2025 09:59 AM    Globulin, Total 2.3 10/10/2024 11:37 AM    Globulin, Total 2.4 07/10/2024 10:40 AM    HDL 38 (L) 01/24/2025 09:59 AM    Triglycerides 124 01/24/2025 09:59 AM           Imaging Studies: Results Review Statement: No pertinent imaging studies reviewed.    Portions of the record may have been created with voice recognition software. Occasional wrong word or \"sound a like\" substitutions may have occurred due to the inherent limitations of voice recognition software. Read the chart carefully and recognize, using context, where substitutions have occurred.    "

## 2025-01-28 NOTE — PATIENT INSTRUCTIONS
Increase Basaglar 20 units twice a day.     Continue with Actos, metformin.     Continue glimepiride to 4 mg twice a day.     We will work on getting a Dexcom approved.  If you do not hear from us in about 2 weeks, please contact the office.     Call with any concerns or questions.     Follow up in 3 months with lab work prior to visit.

## 2025-02-05 DIAGNOSIS — Z79.4 TYPE 2 DIABETES MELLITUS WITH DIABETIC POLYNEUROPATHY, WITH LONG-TERM CURRENT USE OF INSULIN (HCC): Primary | ICD-10-CM

## 2025-02-05 DIAGNOSIS — E11.42 TYPE 2 DIABETES MELLITUS WITH DIABETIC POLYNEUROPATHY, WITH LONG-TERM CURRENT USE OF INSULIN (HCC): Primary | ICD-10-CM

## 2025-02-19 ENCOUNTER — TELEPHONE (OUTPATIENT)
Dept: ENDOCRINOLOGY | Facility: HOSPITAL | Age: 75
End: 2025-02-19

## 2025-02-19 DIAGNOSIS — E11.42 TYPE 2 DIABETES MELLITUS WITH DIABETIC POLYNEUROPATHY, WITH LONG-TERM CURRENT USE OF INSULIN (HCC): Primary | ICD-10-CM

## 2025-02-19 DIAGNOSIS — Z79.4 TYPE 2 DIABETES MELLITUS WITH DIABETIC POLYNEUROPATHY, WITH LONG-TERM CURRENT USE OF INSULIN (HCC): Primary | ICD-10-CM

## 2025-02-19 RX ORDER — INSULIN GLARGINE-YFGN 100 [IU]/ML
20 INJECTION, SOLUTION SUBCUTANEOUS 2 TIMES DAILY
Qty: 30 ML | Refills: 3 | Status: SHIPPED | OUTPATIENT
Start: 2025-02-19

## 2025-04-23 ENCOUNTER — TELEPHONE (OUTPATIENT)
Dept: DIABETES SERVICES | Facility: CLINIC | Age: 75
End: 2025-04-23

## 2025-04-23 NOTE — TELEPHONE ENCOUNTER
L/M to schedule CGM training per referral. If patient calls back, please schedule...    CGM Training- The following information should be in Note section    What type of CGM ? (Tolu or Dexcom) confirm    What version? ( Tolu 2(Plus),Tolu 3(Plus), Dexcom G6, Dexcom G7) confirm    Will the patient be using the [place] or the Application on their phone? confirm    If using a phone, be sure they know which apps need to be downloaded prior to their appointment. ( Tolu 2, Tolu 3, Dexcom G6, Dexcom G7, Dexcom Clarity)   Please confirm patient's email  New patient  75 Minutes    Send to Education Clerical Pool/Leisa to send paperwork

## 2025-04-29 ENCOUNTER — RESULTS FOLLOW-UP (OUTPATIENT)
Dept: ENDOCRINOLOGY | Facility: HOSPITAL | Age: 75
End: 2025-04-29

## 2025-04-29 LAB
ALBUMIN SERPL-MCNC: 4.2 G/DL (ref 3.8–4.8)
ALP SERPL-CCNC: 107 IU/L (ref 44–121)
ALT SERPL-CCNC: 34 IU/L (ref 0–44)
AST SERPL-CCNC: 30 IU/L (ref 0–40)
BILIRUB SERPL-MCNC: 0.4 MG/DL (ref 0–1.2)
BUN SERPL-MCNC: 25 MG/DL (ref 8–27)
BUN/CREAT SERPL: 19 (ref 10–24)
CALCIUM SERPL-MCNC: 9.5 MG/DL (ref 8.6–10.2)
CHLORIDE SERPL-SCNC: 102 MMOL/L (ref 96–106)
CO2 SERPL-SCNC: 24 MMOL/L (ref 20–29)
CREAT SERPL-MCNC: 1.32 MG/DL (ref 0.76–1.27)
EGFR: 57 ML/MIN/1.73
EST. AVERAGE GLUCOSE BLD GHB EST-MCNC: 214 MG/DL
GLOBULIN SER-MCNC: 2.6 G/DL (ref 1.5–4.5)
GLUCOSE SERPL-MCNC: 240 MG/DL (ref 70–99)
HBA1C MFR BLD: 9.1 % (ref 4.8–5.6)
POTASSIUM SERPL-SCNC: 5.2 MMOL/L (ref 3.5–5.2)
PROT SERPL-MCNC: 6.8 G/DL (ref 6–8.5)
SODIUM SERPL-SCNC: 141 MMOL/L (ref 134–144)

## 2025-04-29 NOTE — TELEPHONE ENCOUNTER
Patient calling back in regard to scheduling device training    CGM Training- The following information should be in Note section     What type of CGM ? Dexcom     What version? Dexcom G7     Will the patient be using the [place] or the Application on their phone? [place]     If using a phone, be sure they know which apps need to be downloaded prior to their appointment. ( Tolu 2, Tolu 3, Dexcom G6, Dexcom G7, Dexcom Clarity)   Please confirm patient's email  New patient  75 Minutes     Send to Education Clerical Pool/Leisa to send paperwork

## 2025-05-01 ENCOUNTER — OFFICE VISIT (OUTPATIENT)
Dept: ENDOCRINOLOGY | Facility: HOSPITAL | Age: 75
End: 2025-05-01
Payer: MEDICARE

## 2025-05-01 VITALS
SYSTOLIC BLOOD PRESSURE: 144 MMHG | OXYGEN SATURATION: 96 % | HEART RATE: 72 BPM | BODY MASS INDEX: 38.65 KG/M2 | WEIGHT: 270 LBS | HEIGHT: 70 IN | DIASTOLIC BLOOD PRESSURE: 72 MMHG

## 2025-05-01 DIAGNOSIS — Z79.4 TYPE 2 DIABETES MELLITUS WITH DIABETIC POLYNEUROPATHY, WITH LONG-TERM CURRENT USE OF INSULIN (HCC): Primary | ICD-10-CM

## 2025-05-01 DIAGNOSIS — E11.42 TYPE 2 DIABETES MELLITUS WITH DIABETIC POLYNEUROPATHY, WITH LONG-TERM CURRENT USE OF INSULIN (HCC): Primary | ICD-10-CM

## 2025-05-01 DIAGNOSIS — E78.5 HYPERLIPIDEMIA, UNSPECIFIED HYPERLIPIDEMIA TYPE: ICD-10-CM

## 2025-05-01 PROCEDURE — G2211 COMPLEX E/M VISIT ADD ON: HCPCS | Performed by: PHYSICIAN ASSISTANT

## 2025-05-01 PROCEDURE — 99214 OFFICE O/P EST MOD 30 MIN: CPT | Performed by: PHYSICIAN ASSISTANT

## 2025-05-01 NOTE — ASSESSMENT & PLAN NOTE
A1c has increased slightly since last office visit.  He does have Dexcom supplies on hand, and is scheduled to see diabetes education on May 5, 2025.  Will have him wear the sensor for 2 weeks, and from there we will determine what kind of adjustments we need to make to his medications.  I did inform him that we may need to start mealtime insulin as he is on the max dose of his current medications, and is unable to afford any namebrand diabetes medications.  Contact the office with any concerns or questions.  Follow-up in 3 months with labwork completed prior to visit.  Lab Results   Component Value Date    HGBA1C 9.1 (H) 04/28/2025       Orders:  •  Hemoglobin A1C; Future  •  Comprehensive metabolic panel; Future

## 2025-05-01 NOTE — PROGRESS NOTES
Name: Ayden Sierra      : 1950      MRN: [id number]  Encounter Provider: Ayden Flores PA-C  Encounter Date: 2025   Encounter department: [facility]    No chief complaint on file.  :  Assessment & Plan  Type 2 diabetes mellitus with diabetic polyneuropathy, with long-term current use of insulin (HCC)  A1c has increased slightly since last office visit.  He does have Dexcom supplies on hand, and is scheduled to see diabetes education on May 5, 2025.  Will have him wear the sensor for 2 weeks, and from there we will determine what kind of adjustments we need to make to his medications.  I did inform him that we may need to start mealtime insulin as he is on the max dose of his current medications, and is unable to afford any namebrand diabetes medications.  Contact the office with any concerns or questions.  Follow-up in 3 months with labwork completed prior to visit.  Lab Results   Component Value Date    HGBA1C 9.1 (H) 2025       Orders:  •  Hemoglobin A1C; Future  •  Comprehensive metabolic panel; Future    Hyperlipidemia, unspecified hyperlipidemia type  Most recent lipid panel was excellent.  Continue with rosuvastatin 5 mg daily.  Will continue to monitor over time.           History of Present Illness     Ayden Sierra is a 75 y.o. male with type 2 diabetes for about 23 years.  He is on oral agents and insulin at home and takes Actos 45 mg daily, Metformin 1000 twice daily, Glimepiride 4 mg daily, Lantus 15 units twice a day.  His most recent hemoglobin A1c completed October 10, 2024 was 9.4.  Is no longer utilizing Ozempic due to cost of medication.  Also did not notice any weight loss while taking it.  He denies any episodes of hypoglycemia, polyuria, polydipsia, nocturia and blurry vision.  He denies nephropathy and retinopathy but does admit to neuropathy.  Recently diagnosed with a right foot ulcer.  Currently being managed by  PCP.  Since he is living by himself now, he does not think he is going to have the amputation of his left foot as he would likely need to be placed in a nursing home.  Otherwise he is doing well today.  Has Dexcom supplies on hand, and has an appointment with diabetes education next week.     No symptoms of hypoglycemia.  Does not routinely check blood sugar due to neuropathy.     His most recent diabetic foot exam was performed November 2024.  Was recommended by podiatrist that he will likely need an amputation.  Not only is he continuing to have pain in his right foot, has been having complications in his right knee and right hip also.  Could be due to change in his gait to compensate for the pain.     Blood Sugar/Glucometer/Pump/CGM review: Has not been checking glucose levels recently due to neuropathy in bilateral hands.     For his hyperlipidemia, he is treated with Crestor 5 mg daily.  Denies any headaches, vision changes, chest pain, MI or stroke-like symptoms.    Current regimen:   Basaglar 20 units twice a day, metformin 1000 mg twice a day, pioglitazone 45 mg daily, glimepiride 4 mg twice a day      Last Eye Exam: 12/22/2023  Last Foot Exam: 11/05/2024  Health Maintenance   Topic Date Due   • Diabetic Eye Exam  08/01/2025 (Originally 12/22/2024)   • Diabetic Foot Exam  11/05/2025           Review of Systems   Constitutional:  Negative for activity change, appetite change, fatigue and unexpected weight change.   HENT:  Negative for trouble swallowing.    Eyes:  Negative for visual disturbance.   Respiratory:  Negative for chest tightness and shortness of breath.    Cardiovascular:  Negative for chest pain, palpitations and leg swelling.   Gastrointestinal:  Negative for abdominal pain, diarrhea, nausea and vomiting.   Endocrine: Negative for cold intolerance, heat intolerance, polydipsia, polyphagia and polyuria.   Genitourinary:  Negative for frequency.   Musculoskeletal:  Positive for arthralgias and  gait problem (Utilizing cane).   Skin:  Positive for wound (Left foot and right foot). Negative for rash.   Neurological:  Positive for numbness. Negative for dizziness, weakness, light-headedness and headaches.   Psychiatric/Behavioral:  Negative for dysphoric mood and sleep disturbance. The patient is not nervous/anxious.     as per HPI    Medical History Reviewed by provider this encounter:     .  Current Outpatient Medications on File Prior to Visit   Medication Sig Dispense Refill   • [facility] Unifine Pentips Plus 31G X 8 MM MISC 2 (two) times a day Use as directed     • clopidogrel (PLAVIX) 75 mg tablet Take 75 mg by mouth daily     • famotidine (PEPCID) 20 mg tablet 2 (two) times a day      • gabapentin (NEURONTIN) 600 MG tablet TAKE ONE TABLET BY MOUTH 3 TIMES DAILY AT 0600, 1400, AND 2200     • glimepiride (AMARYL) 4 mg tablet Take 1 tablet (4 mg total) by mouth 2 (two) times a day 180 tablet 3   • Insulin Glargine-yfgn 100 UNIT/ML SOPN Inject 0.2 mL (20 Units total) under the skin 2 (two) times a day 30 mL 3   • metFORMIN (GLUCOPHAGE) 1000 MG tablet Take 1 tablet (1,000 mg total) by mouth 2 (two) times a day with meals 180 tablet 1   • Multiple Vitamin (multivitamin) capsule Take 1 capsule by mouth daily     • patient supplied medication Medical Marijuana     • pregabalin (LYRICA) 50 mg capsule Take 50 mg by mouth 3 (three) times a day     • rosuvastatin (CRESTOR) 5 mg tablet Take 5 mg by mouth daily     • [DISCONTINUED] Insulin Glargine Solostar (Basaglar KwikPen) 100 UNIT/ML SOPN 20 units twice daily (Patient not taking: Reported on 5/1/2025) 30 mL 3     No current facility-administered medications on file prior to visit.      Social History     Tobacco Use   • Smoking status: Former     Current packs/day: 0.75     Average packs/day: 0.7 packs/day for 41.7 years (31.3 ttl pk-yrs)     Types: Cigarettes     Start date: 8/14/1983   • Smokeless tobacco: Never   Vaping Use   • Vaping status: Never Used  "  Substance and Sexual Activity   • Alcohol use: Not Currently   • Drug use: Not Currently     Frequency: 5.0 times per week     Types: Marijuana   • Sexual activity: Not Currently     Partners: Female     Birth control/protection: Male Sterilization        Medical History Reviewed by provider this encounter:     .    Objective   /72   Pulse 72   Ht 5' 10\" (1.778 m)   Wt 122 kg (270 lb)   SpO2 96%   BMI 38.74 kg/m²      Body mass index is 38.74 kg/m².  Wt Readings from Last 3 Encounters:   05/01/25 122 kg (270 lb)   01/28/25 124 kg (273 lb)   10/17/24 123 kg (271 lb)     Physical Exam  Vitals and nursing note reviewed.   Constitutional:       General: He is not in acute distress.     Appearance: Normal appearance. He is well-developed. He is not diaphoretic.   Eyes:      General: No scleral icterus.     Extraocular Movements: Extraocular movements intact.      Conjunctiva/sclera: Conjunctivae normal.      Pupils: Pupils are equal, round, and reactive to light.   Cardiovascular:      Rate and Rhythm: Normal rate and regular rhythm.      Pulses: Normal pulses.      Heart sounds: Normal heart sounds. No murmur heard.     No friction rub. No gallop.   Pulmonary:      Effort: Pulmonary effort is normal. No tachypnea, bradypnea or respiratory distress.      Breath sounds: Normal breath sounds. No wheezing.   Musculoskeletal:      Cervical back: Normal range of motion.      Right lower leg: No edema.      Left lower leg: No edema.   Lymphadenopathy:      Cervical: No cervical adenopathy.   Skin:     General: Skin is warm and dry.   Neurological:      Mental Status: He is alert and oriented to person, place, and time. Mental status is at baseline. He is not disoriented.      Motor: No abnormal muscle tone.      Gait: Gait normal.      Deep Tendon Reflexes: Reflexes are normal and symmetric.   Psychiatric:         Mood and Affect: Mood normal.         Behavior: Behavior normal.         Thought Content: Thought " "content normal.         Labs:   Lab Results   Component Value Date    HGBA1C 9.1 (H) 04/28/2025    HGBA1C 8.8 (H) 01/24/2025    HGBA1C 9.4 (H) 10/10/2024     Lab Results   Component Value Date    CREATININE 1.32 (H) 04/28/2025    CREATININE 1.34 (H) 01/24/2025    CREATININE 1.31 (H) 10/10/2024    BUN 25 04/28/2025    K 5.2 04/28/2025     04/28/2025    CO2 24 04/28/2025     eGFR   Date Value Ref Range Status   04/28/2025 57 (L) >59 mL/min/1.73 Final     Lab Results   Component Value Date    HDL 38 (L) 01/24/2025    TRIG 124 01/24/2025    CHOLHDL 2.9 01/24/2025     Lab Results   Component Value Date    ALT 34 04/28/2025    AST 30 04/28/2025     No results found for: \"[id number]\"    Patient Instructions   Continue Basaglar 20 units twice a day.     Continue with Actos, metformin.     Continue glimepiride to 4 mg twice a day.     We will work on getting a Dexcom approved.  If you do not hear from us in about 2 weeks, please contact the office.     Call with any concerns or questions.     Follow up in 3 months with lab work prior to visit.    Discussed with the patient and all questioned fully answered. He will call me if any problems arise.      "

## 2025-05-01 NOTE — PATIENT INSTRUCTIONS
Continue Basaglar 20 units twice a day.     Continue with Actos, metformin.     Continue glimepiride to 4 mg twice a day.     We will work on getting a Dexcom approved.  If you do not hear from us in about 2 weeks, please contact the office.     Call with any concerns or questions.     Follow up in 3 months with lab work prior to visit.

## 2025-05-01 NOTE — ASSESSMENT & PLAN NOTE
Most recent lipid panel was excellent.  Continue with rosuvastatin 5 mg daily.  Will continue to monitor over time.

## 2025-05-05 ENCOUNTER — CONSULT (OUTPATIENT)
Dept: DIABETES SERVICES | Facility: HOSPITAL | Age: 75
End: 2025-05-05
Attending: PHYSICIAN ASSISTANT
Payer: MEDICARE

## 2025-05-05 VITALS — HEIGHT: 70 IN | BODY MASS INDEX: 38.65 KG/M2 | WEIGHT: 270 LBS

## 2025-05-05 DIAGNOSIS — Z79.4 TYPE 2 DIABETES MELLITUS WITH DIABETIC POLYNEUROPATHY, WITH LONG-TERM CURRENT USE OF INSULIN (HCC): Primary | ICD-10-CM

## 2025-05-05 DIAGNOSIS — E11.42 TYPE 2 DIABETES MELLITUS WITH DIABETIC POLYNEUROPATHY, WITH LONG-TERM CURRENT USE OF INSULIN (HCC): Primary | ICD-10-CM

## 2025-05-05 PROCEDURE — 95249 CONT GLUC MNTR PT PROV EQP: CPT | Performed by: DIETITIAN, REGISTERED

## 2025-05-05 NOTE — PROGRESS NOTES
Dexcom G7 Personal Training    Met with Ayden Sierra for Dexcom G7 personal training. Patient comes in today with there own unit to be trained on. Completed all aspects of training, including site selection on rotation, not infusing insulin near the sensor site, proper insertion technique, inserting codes into the , charging, waterproof sensor, range of 20ft, setting high low alarms that can be adjusted based on their preferences. They put on their first sensor by themselves with no issue.  Left my office today with sensor on and in 30 min warm up mode.      Ayden Sierra will be running the dexcom through their .    Discussed creating a [employer] account to be able to link and upload from home or auto upload from their phone. Patient was added to our clarity account today while in office and invited to link to us if using their phone. Patient understands that reciever will be downloaded while in office at time of visit and/or cell phone will automatically upload to our clarity for them. They understand that their blood sugars are not monitored by us on a regular basis, but that we can access them as needed or desired by the patient and provider.     Dexcom's phone number is in their paperwork, encouraged Ayden to reach out to [employer] if they have any issues after hours, 24/7. Training completed, will call with questions.     Lab Results   Component Value Date    HGBA1C 9.1 (H) 04/28/2025       Lab Results   Component Value Date    SODIUM 141 04/28/2025    K 5.2 04/28/2025     04/28/2025    CO2 24 04/28/2025    BUN 25 04/28/2025    CREATININE 1.32 (H) 04/28/2025    GLUC 240 (H) 04/28/2025    AST 30 04/28/2025    ALT 34 04/28/2025    TP 6.8 04/28/2025    TBILI 0.4 04/28/2025    EGFR 57 (L) 04/28/2025           Patient response to instruction    Comprehension: good  Motivation: good  Expected Compliance: good  Response to Teachback: 100%, demonstrated understanding    Thank you for referring your  patient to [facility], it was a pleasure working with them today. Please feel free to call with any questions or concerns.

## 2025-06-18 ENCOUNTER — TELEPHONE (OUTPATIENT)
Age: 75
End: 2025-06-18

## 2025-06-18 NOTE — TELEPHONE ENCOUNTER
Received a call from the patient asking if we can print off his lab orders so he can stop by the office to pick them up. He said he would stop by next week. Thanks!

## 2025-06-19 ENCOUNTER — DOCUMENTATION (OUTPATIENT)
Dept: ENDOCRINOLOGY | Facility: HOSPITAL | Age: 75
End: 2025-06-19

## 2025-08-11 ENCOUNTER — ANESTHESIA EVENT (OUTPATIENT)
Age: 75
End: 2025-08-11
Payer: MEDICARE

## 2025-08-14 ENCOUNTER — HOSPITAL ENCOUNTER (OUTPATIENT)
Age: 75
Setting detail: OUTPATIENT SURGERY
Discharge: HOME | End: 2025-08-14
Attending: OPHTHALMOLOGY | Admitting: OPHTHALMOLOGY
Payer: MEDICARE

## 2025-08-14 ENCOUNTER — ANESTHESIA (OUTPATIENT)
Age: 75
End: 2025-08-14
Payer: MEDICARE